# Patient Record
Sex: FEMALE | Employment: OTHER | URBAN - METROPOLITAN AREA
[De-identification: names, ages, dates, MRNs, and addresses within clinical notes are randomized per-mention and may not be internally consistent; named-entity substitution may affect disease eponyms.]

---

## 2017-11-08 ENCOUNTER — HOSPITAL ENCOUNTER (EMERGENCY)
Age: 82
Discharge: HOME | End: 2017-11-08
Attending: EMERGENCY MEDICINE | Admitting: EMERGENCY MEDICINE
Payer: MEDICARE

## 2017-11-08 ENCOUNTER — APPOINTMENT (OUTPATIENT)
Dept: GENERAL RADIOLOGY | Age: 82
End: 2017-11-08
Attending: EMERGENCY MEDICINE
Payer: MEDICARE

## 2017-11-08 ENCOUNTER — APPOINTMENT (OUTPATIENT)
Dept: CT IMAGING | Age: 82
End: 2017-11-08
Attending: EMERGENCY MEDICINE
Payer: MEDICARE

## 2017-11-08 VITALS
OXYGEN SATURATION: 96 % | RESPIRATION RATE: 18 BRPM | SYSTOLIC BLOOD PRESSURE: 134 MMHG | BODY MASS INDEX: 32.22 KG/M2 | DIASTOLIC BLOOD PRESSURE: 62 MMHG | WEIGHT: 165 LBS | TEMPERATURE: 98.2 F | HEART RATE: 66 BPM

## 2017-11-08 DIAGNOSIS — R07.89 ATYPICAL CHEST PAIN: Primary | ICD-10-CM

## 2017-11-08 DIAGNOSIS — K44.9 HIATAL HERNIA: ICD-10-CM

## 2017-11-08 DIAGNOSIS — R09.1 PLEURISY: ICD-10-CM

## 2017-11-08 PROBLEM — R07.9 CHEST PAIN: Status: ACTIVE | Noted: 2017-11-08

## 2017-11-08 LAB
ALBUMIN SERPL-MCNC: 3.6 G/DL (ref 3.5–5)
ALBUMIN/GLOB SERPL: 1 {RATIO} (ref 1.1–2.2)
ALP SERPL-CCNC: 120 U/L (ref 45–117)
ALT SERPL-CCNC: 42 U/L (ref 12–78)
AMYLASE SERPL-CCNC: 34 U/L (ref 25–115)
ANION GAP SERPL CALC-SCNC: 8 MMOL/L (ref 5–15)
APTT PPP: <20 SEC (ref 22.1–32.5)
ARTERIAL PATENCY WRIST A: YES
AST SERPL-CCNC: 100 U/L (ref 15–37)
ATRIAL RATE: 83 BPM
BASE EXCESS BLD CALC-SCNC: 1 MMOL/L
BASOPHILS # BLD: 0 K/UL (ref 0–0.1)
BASOPHILS NFR BLD: 0 % (ref 0–1)
BDY SITE: ABNORMAL
BILIRUB SERPL-MCNC: 0.9 MG/DL (ref 0.2–1)
BUN SERPL-MCNC: 17 MG/DL (ref 6–20)
BUN/CREAT SERPL: 20 (ref 12–20)
CALCIUM SERPL-MCNC: 8.1 MG/DL (ref 8.5–10.1)
CALCULATED P AXIS, ECG09: 36 DEGREES
CALCULATED R AXIS, ECG10: -13 DEGREES
CALCULATED T AXIS, ECG11: 72 DEGREES
CHLORIDE SERPL-SCNC: 109 MMOL/L (ref 97–108)
CK MB CFR SERPL CALC: 1.3 % (ref 0–2.5)
CK MB SERPL-MCNC: 1.3 NG/ML (ref 5–25)
CK SERPL-CCNC: 97 U/L (ref 26–192)
CO2 SERPL-SCNC: 27 MMOL/L (ref 21–32)
CREAT SERPL-MCNC: 0.83 MG/DL (ref 0.55–1.02)
D DIMER PPP FEU-MCNC: 0.98 MG/L FEU (ref 0–0.65)
DIAGNOSIS, 93000: NORMAL
DIFFERENTIAL METHOD BLD: ABNORMAL
EOSINOPHIL # BLD: 0.1 K/UL (ref 0–0.4)
EOSINOPHIL NFR BLD: 1 % (ref 0–7)
ERYTHROCYTE [DISTWIDTH] IN BLOOD BY AUTOMATED COUNT: 15.6 % (ref 11.5–14.5)
GAS FLOW.O2 O2 DELIVERY SYS: ABNORMAL L/MIN
GLOBULIN SER CALC-MCNC: 3.6 G/DL (ref 2–4)
GLUCOSE BLD STRIP.AUTO-MCNC: 108 MG/DL (ref 65–100)
GLUCOSE SERPL-MCNC: 117 MG/DL (ref 65–100)
HCO3 BLD-SCNC: 24.9 MMOL/L (ref 22–26)
HCT VFR BLD AUTO: 42.6 % (ref 35–47)
HGB BLD-MCNC: 13.6 G/DL (ref 11.5–16)
INR PPP: 1 (ref 0.9–1.1)
LIPASE SERPL-CCNC: 130 U/L (ref 73–393)
LYMPHOCYTES # BLD: 0.7 K/UL (ref 0.8–3.5)
LYMPHOCYTES NFR BLD: 6 % (ref 12–49)
MCH RBC QN AUTO: 27.8 PG (ref 26–34)
MCHC RBC AUTO-ENTMCNC: 31.9 G/DL (ref 30–36.5)
MCV RBC AUTO: 87.1 FL (ref 80–99)
MONOCYTES # BLD: 1.4 K/UL (ref 0–1)
MONOCYTES NFR BLD: 11 % (ref 5–13)
NEUTS SEG # BLD: 10.1 K/UL (ref 1.8–8)
NEUTS SEG NFR BLD: 82 % (ref 32–75)
O2/TOTAL GAS SETTING VFR VENT: 21 %
P-R INTERVAL, ECG05: 136 MS
PCO2 BLD: 37.5 MMHG (ref 35–45)
PH BLD: 7.43 [PH] (ref 7.35–7.45)
PLATELET # BLD AUTO: 228 K/UL (ref 150–400)
PO2 BLD: 67 MMHG (ref 80–100)
POTASSIUM SERPL-SCNC: 4 MMOL/L (ref 3.5–5.1)
PROT SERPL-MCNC: 7.2 G/DL (ref 6.4–8.2)
PROTHROMBIN TIME: 10.3 SEC (ref 9–11.1)
Q-T INTERVAL, ECG07: 390 MS
QRS DURATION, ECG06: 72 MS
QTC CALCULATION (BEZET), ECG08: 458 MS
RBC # BLD AUTO: 4.89 M/UL (ref 3.8–5.2)
RBC MORPH BLD: ABNORMAL
SAO2 % BLD: 94 % (ref 92–97)
SERVICE CMNT-IMP: ABNORMAL
SODIUM SERPL-SCNC: 144 MMOL/L (ref 136–145)
SPECIMEN TYPE: ABNORMAL
THERAPEUTIC RANGE,PTTT: ABNORMAL SECS (ref 58–77)
TROPONIN I BLD-MCNC: <0.04 NG/ML (ref 0–0.08)
TROPONIN I SERPL-MCNC: <0.04 NG/ML
VENTRICULAR RATE, ECG03: 83 BPM
WBC # BLD AUTO: 12.3 K/UL (ref 3.6–11)

## 2017-11-08 PROCEDURE — 36600 WITHDRAWAL OF ARTERIAL BLOOD: CPT

## 2017-11-08 PROCEDURE — 82550 ASSAY OF CK (CPK): CPT | Performed by: EMERGENCY MEDICINE

## 2017-11-08 PROCEDURE — 85379 FIBRIN DEGRADATION QUANT: CPT | Performed by: EMERGENCY MEDICINE

## 2017-11-08 PROCEDURE — 82150 ASSAY OF AMYLASE: CPT | Performed by: EMERGENCY MEDICINE

## 2017-11-08 PROCEDURE — 82803 BLOOD GASES ANY COMBINATION: CPT

## 2017-11-08 PROCEDURE — 74011636320 HC RX REV CODE- 636/320: Performed by: EMERGENCY MEDICINE

## 2017-11-08 PROCEDURE — 82962 GLUCOSE BLOOD TEST: CPT

## 2017-11-08 PROCEDURE — 85730 THROMBOPLASTIN TIME PARTIAL: CPT | Performed by: EMERGENCY MEDICINE

## 2017-11-08 PROCEDURE — 85610 PROTHROMBIN TIME: CPT | Performed by: EMERGENCY MEDICINE

## 2017-11-08 PROCEDURE — 84484 ASSAY OF TROPONIN QUANT: CPT | Performed by: EMERGENCY MEDICINE

## 2017-11-08 PROCEDURE — 71010 XR CHEST PORT: CPT

## 2017-11-08 PROCEDURE — 74011000258 HC RX REV CODE- 258: Performed by: EMERGENCY MEDICINE

## 2017-11-08 PROCEDURE — 80053 COMPREHEN METABOLIC PANEL: CPT | Performed by: EMERGENCY MEDICINE

## 2017-11-08 PROCEDURE — 83690 ASSAY OF LIPASE: CPT | Performed by: EMERGENCY MEDICINE

## 2017-11-08 PROCEDURE — 93005 ELECTROCARDIOGRAM TRACING: CPT

## 2017-11-08 PROCEDURE — 85025 COMPLETE CBC W/AUTO DIFF WBC: CPT | Performed by: EMERGENCY MEDICINE

## 2017-11-08 PROCEDURE — 99285 EMERGENCY DEPT VISIT HI MDM: CPT

## 2017-11-08 PROCEDURE — 74011250637 HC RX REV CODE- 250/637: Performed by: EMERGENCY MEDICINE

## 2017-11-08 PROCEDURE — 36415 COLL VENOUS BLD VENIPUNCTURE: CPT | Performed by: EMERGENCY MEDICINE

## 2017-11-08 PROCEDURE — 71275 CT ANGIOGRAPHY CHEST: CPT

## 2017-11-08 RX ORDER — CODEINE PHOSPHATE AND GUAIFENESIN 10; 100 MG/5ML; MG/5ML
5 SOLUTION ORAL
Qty: 120 ML | Refills: 0 | Status: SHIPPED | OUTPATIENT
Start: 2017-11-08 | End: 2022-01-18

## 2017-11-08 RX ORDER — SODIUM CHLORIDE 0.9 % (FLUSH) 0.9 %
10 SYRINGE (ML) INJECTION
Status: COMPLETED | OUTPATIENT
Start: 2017-11-08 | End: 2017-11-08

## 2017-11-08 RX ORDER — CODEINE PHOSPHATE AND GUAIFENESIN 10; 100 MG/5ML; MG/5ML
10 SOLUTION ORAL
Status: COMPLETED | OUTPATIENT
Start: 2017-11-08 | End: 2017-11-08

## 2017-11-08 RX ORDER — OMEPRAZOLE 20 MG/1
20 CAPSULE, DELAYED RELEASE ORAL DAILY
Qty: 30 CAP | Refills: 0 | Status: SHIPPED | OUTPATIENT
Start: 2017-11-08 | End: 2022-01-18

## 2017-11-08 RX ORDER — PANTOPRAZOLE SODIUM 40 MG/1
40 TABLET, DELAYED RELEASE ORAL DAILY
Qty: 30 TAB | Refills: 0 | Status: SHIPPED | OUTPATIENT
Start: 2017-11-08 | End: 2022-01-18

## 2017-11-08 RX ADMIN — IOPAMIDOL 75 ML: 755 INJECTION, SOLUTION INTRAVENOUS at 06:40

## 2017-11-08 RX ADMIN — Medication 10 ML: at 06:40

## 2017-11-08 RX ADMIN — GUAIFENESIN AND CODEINE PHOSPHATE 10 ML: 100; 10 SOLUTION ORAL at 05:25

## 2017-11-08 RX ADMIN — SODIUM CHLORIDE 100 ML: 900 INJECTION, SOLUTION INTRAVENOUS at 06:40

## 2017-11-08 NOTE — ED TRIAGE NOTES
Patient comes in vis EMS. Per EMS patient woke up about 2 hours ago with sub-sternal chest pain that is not radiating. Rates it 3/10. Per patient she woke up with chest pain, shortness of breath and a headache.

## 2017-11-08 NOTE — ED NOTES
Assumed care of patient at this time. She comes in via EMS stating that she woke up about 0300 with substernal chest pain, rated at 3/10, also had shortness of breath and a headache. By 6403 she had called EMS to bring her to the hospital. Her initial SpO2 was 88% on RA, initiated 2L NC and she improved to 92%. She reports at this time that she still has chest pain but the shortness of breath is improved. IV established, labs sent and EKG completed.

## 2017-11-08 NOTE — ED NOTES
Pt wheeled out of ED with discharge instructions and prescriptions in hand given by Dr. Yolette Dc; pt verbalized understanding of discharge paperwork and time allotted for questions. VSS. Pt alert and oriented.

## 2017-11-08 NOTE — ED NOTES
Reassessed patient at this time. She has completed a Chest CT, and has gotten up to void x1, Urine sent on hold. She states she feels significantly better, her saturations are improved and her pain is minimal. Dr. Argenis Augustin went to bedside for a quick update, told the family we are waiting for the CT report and pending those results we will develop a plan of care. Patient and  verbalize understanding. Call bell remains within reach of patient.

## 2017-11-08 NOTE — ED NOTES
Patient is resting comfortably in bed at this time. VSS. SpO2 greatly improved on 3L NC. Call bell within reach of patient.

## 2017-11-08 NOTE — ED PROVIDER NOTES
HPI Comments: The patient is a 60-year-old female with a past medical history significant for GERD, skin cancer, liver disease, diabetes, arthritis, chronic back pain, status post hysterectomy, cholecystectomy, who presents to the ED by EMS with a complaint of his tendon sharp, stabbing chest pain that began 2 days ago, and getting progressively worse, aggravated by coughing, and no relieving factors. The patient is calm is symptom free and comfortable. She is also complaining of headache,. She denies any neck pain, back pain, abdominal pain, nausea, vomiting, diarrhea, constipation, dysuria, hematuria, vaginal discharge or bleeding, dizziness, weakness, or numbness, prior history of same. The patient also denies any cigarette smoke exposure, sick contacts or recent travel. Patient is a [de-identified] y.o. female presenting with chest pain. Chest Pain (Angina)           Past Medical History:   Diagnosis Date    Arthritis     OSTEO, SPINE    Cancer ([facility] [address] 2001    BASAL CELL NOSE    Chronic pain     BACK    Diabetes ([facility] [address]     BORDERLINE, NIDDM    GERD (gastroesophageal reflux disease)     HIATAL HERNIA    Liver disease 1960    HEP A       Past Surgical History:   Procedure Laterality Date    HX CHOLECYSTECTOMY  2008    HX RALPH AND BSO  1995    HX TONSILLECTOMY      CHILD         Family History:   Problem Relation Age of Onset    Cancer Mother      COLON    Cancer Father      PANCREATIC    Other Brother      VASCULAR DISEASE    Heart Disease Brother        Social History     Social History    Marital status:      Spouse name: N/A    Number of children: N/A    Years of education: N/A     Occupational History    Not on file.      Social History Main Topics    Smoking status: Never Smoker    Smokeless tobacco: Never Used    Alcohol use 1.2 oz/week     1 Glasses of wine, 1 Shots of liquor per week      Comment: 7 DRINKS WEEK    Drug use: No    Sexual activity: Not on file     Other Topics Concern    Not on file     Social History Narrative         ALLERGIES: Review of patient's allergies indicates no known allergies. Review of Systems   Cardiovascular: Positive for chest pain. All other systems reviewed and are negative. Vitals:    11/08/17 0502 11/08/17 0504 11/08/17 0543   BP: (!) 167/95 (!) 167/95    Pulse: 79     Resp: 14     Temp: 97.5 °F (36.4 °C)     SpO2: 90% (!) 89% 97%   Weight: 74.8 kg (165 lb)              Physical Exam   Nursing note and vitals reviewed. CONSTITUTIONAL: Well-appearing; well-nourished; in no apparent distress  HEAD: Normocephalic; atraumatic  EYES: PERRL; EOM intact; conjunctiva and sclera are clear bilaterally. ENT: No rhinorrhea; normal pharynx with no tonsillar hypertrophy; mucous membranes pink/moist, no erythema, no exudate. NECK: Supple; non-tender; no cervical lymphadenopathy  CARD: Normal S1, S2; no murmurs, rubs, or gallops. Regular rate and rhythm. RESP: Normal respiratory effort; breath sounds clear and equal bilaterally; no wheezes, rhonchi, or rales. ABD: Normal bowel sounds; non-distended; non-tender; no palpable organomegaly, no masses, no bruits. Back Exam: Normal inspection; no vertebral point tenderness, no CVA tenderness. Normal range of motion. EXT: Normal ROM in all four extremities; non-tender to palpation; no swelling or deformity; distal pulses are normal, no edema. SKIN: Warm; dry; no rash. NEURO:Alert and oriented x 3, coherent, JAIR-XII grossly intact, sensory and motor are non-focal.        MDM  Number of Diagnoses or Management Options  Diagnosis management comments: Assessment: the patient is an 82-year-old female, who presents with pleuritic chest pain, headache, began this evening and has improved at this time. The patient remained hemodynamically stable. Differential diagnoses consist of ACS, pneumonia, bronchitis,VTE. Plan: EKG/ chest x-ray/ lab/ IV fluid/ antitussives/ CTA chest/ serial exam/ Monitor and Reevaluate. Amount and/or Complexity of Data Reviewed  Clinical lab tests: ordered and reviewed  Tests in the radiology section of CPT®: ordered and reviewed  Tests in the medicine section of CPT®: ordered and reviewed  Discussion of test results with the performing providers: yes  Decide to obtain previous medical records or to obtain history from someone other than the patient: yes  Obtain history from someone other than the patient: yes  Review and summarize past medical records: yes  Discuss the patient with other providers: yes  Independent visualization of images, tracings, or specimens: yes    Risk of Complications, Morbidity, and/or Mortality  Presenting problems: moderate  Diagnostic procedures: moderate  Management options: moderate    Critical Care  Total time providing critical care: (Total critical care time spent exclusive of procedures: 45 minutes)    ED Course       Procedures     ED EKG interpretation:  Rhythm: normal sinus rhythm; and regular . Rate (approx.): 83; Axis: left axis deviation; P wave: normal; QRS interval: normal ; ST/T wave: normal; in  Lead: Diffusely; Other findings: borderline ekg. This EKG was interpreted by Liberty Webb MD,ED Provider. XRAY INTERPRETATION (ED MD)  Chest Xray  No acute process seen. Normal heart size. No bony abnormalities. No infiltrate. Liberty Webb MD 6:02 AM    CONSULT NOTE:  Liberty Webb MD spoke with Dr. Harris Alvarez of Cardiology. Discussed patient's presentation, history, physical assessment, and available diagnostic results. He came to the ED and saw the patient. Agrees with the current plan of care. Patient can be discharged home and will follow up in the office for outpatient reevaluation and work-up as deemed appropriate. Progress Note:   Pt has been reexamined by Liberty Webb MD. Pt is feeling much better. Symptoms have improved. All available results have been reviewed with pt and any available family.  The pt likely has pleurisy from URI vs acute exacerbation of Hiatal hernia. Pt understands sx, dx, and tx in ED. Care plan has been outlined and questions have been answered. Pt is ready to go home. Will send home on Bronchitis/ Hiatal hernia instruction. Prescription of Robitussin AC. Outpatient referral with PCP as needed. Written by Onel Earl MD,8:27 AM    .   .

## 2017-11-08 NOTE — CONSULTS
Date of  Admission: 11/8/2017  4:54 AM     Marion Olmedo is a [de-identified] y.o. female presenting with chest pain  Subjective:  Generally healthy patient without known cardiac disease. Yesterday had dry cough, and early this am woke up with lower chest and upper abdominal pain. Difficult to take a deep breath. Had severe headache. Symptoms resolved in about 2hrs. On arrival to er was concern for hypoxia on oximetry however blood gas showed good oxygenation. cta chest showed no acute disease, very large hiatal hernia. She is active and exercises on treadmill for 30mins at least 3 days a week without symptoms suggestive of angina or chf.    denies dyspnea, irregular heart beats, syncope, orthopnea, paroxysmal nocturnal dyspnea, exertional chest pressure/discomfort, claudication, lower extremity edema. Cardiac risk factors: diabetes mellitus, post-menopausal.    Assessment/Plan:  Her presenting symptoms and evaluation thus far does not suggest an acute coronary syndrome. Low risk factor profile. Could be related to hiatal hernia. At this point do not think she needs any further cardiac testing or follow up.      Patient Active Problem List    Diagnosis Date Noted    Chest pain 11/08/2017    Abdominal pain, other specified site 12/22/2011    Lumbar stenosis 08/03/2011      Leopoldo Madison, MD  Past Medical History:   Diagnosis Date    Arthritis     OSTEO, SPINE    Cancer ([facility] [address] 2001    BASAL CELL NOSE    Chronic pain     BACK    Diabetes ([facility] [address]     BORDERLINE, NIDDM    GERD (gastroesophageal reflux disease)     HIATAL HERNIA    Liver disease 1960    HEP A      Past Surgical History:   Procedure Laterality Date    HX CHOLECYSTECTOMY  2008    HX RALPH AND BSO  1995    HX TONSILLECTOMY      CHILD     No Known Allergies   Family History   Problem Relation Age of Onset    Cancer Mother      COLON    Cancer Father      PANCREATIC    Other Brother      VASCULAR DISEASE    Heart Disease Brother      valve replacement No current facility-administered medications for this encounter. Current Outpatient Prescriptions   Medication Sig    oxybutynin chloride (DITROPAN XL) 5 mg CR tablet Take 2 Tabs by mouth daily.  RISEDRONATE SODIUM (ACTONEL PO) Take  by mouth every seven (7) days.  calcium-vitamin D (CALCIUM 500+D) 500 mg(1,250mg) -200 unit per tablet Take 1 Tab by mouth daily.  pantoprazole (PROTONIX) 40 mg tablet Take 1 Tab by mouth daily.  GLIPIZIDE PO Take  by mouth daily (before dinner).  cholecalciferol, vitamin d3, (VITAMIN D) 1,000 unit tablet Take 1,000 Units by mouth daily. 4 TAB DAILY    celecoxib (CELEBREX) 200 mg capsule Take 200 mg by mouth daily as needed. Review of Symptoms:  A comprehensive review of systems was negative except for: Gastrointestinal: positive for early saitiety    Physical Exam    Visit Vitals    /65    Pulse 65    Temp 98.6 °F (37 °C)    Resp 14    Wt 165 lb (74.8 kg)    SpO2 96%    BMI 32.22 kg/m2     Neck: supple, symmetrical, trachea midline, no adenopathy, no carotid bruit and no JVD  Heart: regular rate and rhythm, S1, S2 normal, no murmur, click, rub or gallop  Lungs: clear to auscultation bilaterally  Abdomen: soft, non-tender.  Bowel sounds normal. No masses,  no organomegaly  Extremities: extremities normal, atraumatic, no cyanosis or edema  Pulses: 2+ and symmetric  Neurologic: Grossly normal    Cardiographics    Telemetry: normal sinus rhythm  ECG: normal sinus rhythm, nonspecific ST and T waves changes  Echocardiogram: Not done    Recent radiology, intake/output and wt reviewed    Labs:   Recent Results (from the past 24 hour(s))   EKG, 12 LEAD, INITIAL    Collection Time: 11/08/17  4:59 AM   Result Value Ref Range    Ventricular Rate 83 BPM    Atrial Rate 83 BPM    P-R Interval 136 ms    QRS Duration 72 ms    Q-T Interval 390 ms    QTC Calculation (Bezet) 458 ms    Calculated P Axis 36 degrees    Calculated R Axis -13 degrees    Calculated T Axis 72 degrees    Diagnosis       Normal sinus rhythm  When compared with ECG of 21-DEC-2011 22:28,  Nonspecific T wave abnormality, improved in Inferior leads  Nonspecific T wave abnormality now evident in Lateral leads     GLUCOSE, POC    Collection Time: 11/08/17  5:06 AM   Result Value Ref Range    Glucose (POC) 108 (H) 65 - 100 mg/dL    Performed by Cinthia Woodard (PCT)    AMYLASE    Collection Time: 11/08/17  5:10 AM   Result Value Ref Range    Amylase 34 25 - 115 U/L   CBC WITH AUTOMATED DIFF    Collection Time: 11/08/17  5:10 AM   Result Value Ref Range    WBC 12.3 (H) 3.6 - 11.0 K/uL    RBC 4.89 3.80 - 5.20 M/uL    HGB 13.6 11.5 - 16.0 g/dL    HCT 42.6 35.0 - 47.0 %    MCV 87.1 80.0 - 99.0 FL    MCH 27.8 26.0 - 34.0 PG    MCHC 31.9 30.0 - 36.5 g/dL    RDW 15.6 (H) 11.5 - 14.5 %    PLATELET 237 413 - 243 K/uL    NEUTROPHILS 82 (H) 32 - 75 %    LYMPHOCYTES 6 (L) 12 - 49 %    MONOCYTES 11 5 - 13 %    EOSINOPHILS 1 0 - 7 %    BASOPHILS 0 0 - 1 %    ABS. NEUTROPHILS 10.1 (H) 1.8 - 8.0 K/UL    ABS. LYMPHOCYTES 0.7 (L) 0.8 - 3.5 K/UL    ABS. MONOCYTES 1.4 (H) 0.0 - 1.0 K/UL    ABS. EOSINOPHILS 0.1 0.0 - 0.4 K/UL    ABS.  BASOPHILS 0.0 0.0 - 0.1 K/UL    DF SMEAR SCANNED      RBC COMMENTS ANISOCYTOSIS  1+       CK W/ CKMB & INDEX    Collection Time: 11/08/17  5:10 AM   Result Value Ref Range    CK 97 26 - 192 U/L    CK - MB 1.3 <3.6 NG/ML    CK-MB Index 1.3 0 - 2.5     D DIMER    Collection Time: 11/08/17  5:10 AM   Result Value Ref Range    D-dimer 0.98 (H) 0.00 - 0.65 mg/L FEU   LIPASE    Collection Time: 11/08/17  5:10 AM   Result Value Ref Range    Lipase 130 73 - 197 U/L   METABOLIC PANEL, COMPREHENSIVE    Collection Time: 11/08/17  5:10 AM   Result Value Ref Range    Sodium 144 136 - 145 mmol/L    Potassium 4.0 3.5 - 5.1 mmol/L    Chloride 109 (H) 97 - 108 mmol/L    CO2 27 21 - 32 mmol/L    Anion gap 8 5 - 15 mmol/L    Glucose 117 (H) 65 - 100 mg/dL    BUN 17 6 - 20 MG/DL    Creatinine 0.83 0.55 - 1.02 MG/DL BUN/Creatinine ratio 20 12 - 20      GFR est AA >60 >60 ml/min/1.73m2    GFR est non-AA >60 >60 ml/min/1.73m2    Calcium 8.1 (L) 8.5 - 10.1 MG/DL    Bilirubin, total 0.9 0.2 - 1.0 MG/DL    ALT (SGPT) 42 12 - 78 U/L    AST (SGOT) 100 (H) 15 - 37 U/L    Alk.  phosphatase 120 (H) 45 - 117 U/L    Protein, total 7.2 6.4 - 8.2 g/dL    Albumin 3.6 3.5 - 5.0 g/dL    Globulin 3.6 2.0 - 4.0 g/dL    A-G Ratio 1.0 (L) 1.1 - 2.2     PROTHROMBIN TIME + INR    Collection Time: 11/08/17  5:10 AM   Result Value Ref Range    INR 1.0 0.9 - 1.1      Prothrombin time 10.3 9.0 - 11.1 sec   PTT    Collection Time: 11/08/17  5:10 AM   Result Value Ref Range    aPTT <20.0 (L) 22.1 - 32.5 sec    aPTT, therapeutic range     58.0 - 77.0 SECS   TROPONIN I    Collection Time: 11/08/17  5:10 AM   Result Value Ref Range    Troponin-I, Qt. <0.04 <0.05 ng/mL   POC G3 - PUL    Collection Time: 11/08/17  5:32 AM   Result Value Ref Range    FIO2 (POC) 21 %    pH (POC) 7.430 7.35 - 7.45      pCO2 (POC) 37.5 35.0 - 45.0 MMHG    pO2 (POC) 67 (L) 80 - 100 MMHG    HCO3 (POC) 24.9 22 - 26 MMOL/L    sO2 (POC) 94 92 - 97 %    Base excess (POC) 1 mmol/L    Site LEFT RADIAL      Device: ROOM AIR      Allens test (POC) YES      Specimen type (POC) ARTERIAL

## 2017-11-08 NOTE — DISCHARGE INSTRUCTIONS
We hope that we have addressed all of your medical concerns. The examination and treatment you received in the Emergency Department were for an emergent problem and were not intended as complete care. It is important that you follow up with your healthcare provider(s) for ongoing care. If your symptoms worsen or do not improve as expected, and you are unable to reach your usual health care provider(s), you should return to the Emergency Department. Today's healthcare is undergoing tremendous change, and patient satisfaction surveys are one of the many tools to assess the quality of medical care. You may receive a survey from the [employer] regarding your experience in the Emergency Department. I hope that your experience has been completely positive, particularly the medical care that I provided. As such, please participate in the survey; anything less than excellent does not meet my expectations or intentions. [facility] and 8 [facility] participate in nationally recognized quality of care measures. If your blood pressure is greater than 120/80, as reported below, we urge that you seek medical care to address the potential of high blood pressure, commonly known as hypertension. Hypertension can be hereditary or can be caused by certain medical conditions, pain, stress, or \"white coat syndrome. \"       Please make an appointment with your health care provider(s) for follow up of your Emergency Department visit. VITALS:   Patient Vitals for the past 8 hrs:   Temp Pulse Resp BP SpO2   11/08/17 0800 - 65 14 138/65 96 %   11/08/17 0600 98.6 °F (37 °C) 74 18 134/63 95 %   11/08/17 0543 - - - - 97 %   11/08/17 0504 - - - (!) 167/95 (!) 89 %   11/08/17 0502 97.5 °F (36.4 °C) 79 14 (!) 167/95 90 %          Thank you for allowing us to provide you with medical care today. We realize that you have many choices for your emergency care needs. Please choose us in the future for any continued health care needs. Moses Pugh MD    [address] [facility].   Office: [phone]            Recent Results (from the past 24 hour(s))   EKG, 12 LEAD, INITIAL    Collection Time: 11/08/17  4:59 AM   Result Value Ref Range    Ventricular Rate 83 BPM    Atrial Rate 83 BPM    P-R Interval 136 ms    QRS Duration 72 ms    Q-T Interval 390 ms    QTC Calculation (Bezet) 458 ms    Calculated P Axis 36 degrees    Calculated R Axis -13 degrees    Calculated T Axis 72 degrees    Diagnosis       Normal sinus rhythm  When compared with ECG of 21-DEC-2011 22:28,  Nonspecific T wave abnormality, improved in Inferior leads  Nonspecific T wave abnormality now evident in Lateral leads     GLUCOSE, POC    Collection Time: 11/08/17  5:06 AM   Result Value Ref Range    Glucose (POC) 108 (H) 65 - 100 mg/dL    Performed by Regan Delgado (PCT)    AMYLASE    Collection Time: 11/08/17  5:10 AM   Result Value Ref Range    Amylase 34 25 - 115 U/L   CBC WITH AUTOMATED DIFF    Collection Time: 11/08/17  5:10 AM   Result Value Ref Range    WBC 12.3 (H) 3.6 - 11.0 K/uL    RBC 4.89 3.80 - 5.20 M/uL    HGB 13.6 11.5 - 16.0 g/dL    HCT 42.6 35.0 - 47.0 %    MCV 87.1 80.0 - 99.0 FL    MCH 27.8 26.0 - 34.0 PG    MCHC 31.9 30.0 - 36.5 g/dL    RDW 15.6 (H) 11.5 - 14.5 %    PLATELET 438 093 - 984 K/uL    NEUTROPHILS 82 (H) 32 - 75 %    LYMPHOCYTES 6 (L) 12 - 49 %    MONOCYTES 11 5 - 13 %    EOSINOPHILS 1 0 - 7 %    BASOPHILS 0 0 - 1 %    ABS. NEUTROPHILS 10.1 (H) 1.8 - 8.0 K/UL    ABS. LYMPHOCYTES 0.7 (L) 0.8 - 3.5 K/UL    ABS. MONOCYTES 1.4 (H) 0.0 - 1.0 K/UL    ABS. EOSINOPHILS 0.1 0.0 - 0.4 K/UL    ABS.  BASOPHILS 0.0 0.0 - 0.1 K/UL    DF SMEAR SCANNED      RBC COMMENTS ANISOCYTOSIS  1+       CK W/ CKMB & INDEX    Collection Time: 11/08/17  5:10 AM   Result Value Ref Range    CK 97 26 - 192 U/L    CK - MB 1.3 <3.6 NG/ML    CK-MB Index 1.3 0 - 2.5     D DIMER Collection Time: 11/08/17  5:10 AM   Result Value Ref Range    D-dimer 0.98 (H) 0.00 - 0.65 mg/L FEU   LIPASE    Collection Time: 11/08/17  5:10 AM   Result Value Ref Range    Lipase 130 73 - 477 U/L   METABOLIC PANEL, COMPREHENSIVE    Collection Time: 11/08/17  5:10 AM   Result Value Ref Range    Sodium 144 136 - 145 mmol/L    Potassium 4.0 3.5 - 5.1 mmol/L    Chloride 109 (H) 97 - 108 mmol/L    CO2 27 21 - 32 mmol/L    Anion gap 8 5 - 15 mmol/L    Glucose 117 (H) 65 - 100 mg/dL    BUN 17 6 - 20 MG/DL    Creatinine 0.83 0.55 - 1.02 MG/DL    BUN/Creatinine ratio 20 12 - 20      GFR est AA >60 >60 ml/min/1.73m2    GFR est non-AA >60 >60 ml/min/1.73m2    Calcium 8.1 (L) 8.5 - 10.1 MG/DL    Bilirubin, total 0.9 0.2 - 1.0 MG/DL    ALT (SGPT) 42 12 - 78 U/L    AST (SGOT) 100 (H) 15 - 37 U/L    Alk.  phosphatase 120 (H) 45 - 117 U/L    Protein, total 7.2 6.4 - 8.2 g/dL    Albumin 3.6 3.5 - 5.0 g/dL    Globulin 3.6 2.0 - 4.0 g/dL    A-G Ratio 1.0 (L) 1.1 - 2.2     PROTHROMBIN TIME + INR    Collection Time: 11/08/17  5:10 AM   Result Value Ref Range    INR 1.0 0.9 - 1.1      Prothrombin time 10.3 9.0 - 11.1 sec   PTT    Collection Time: 11/08/17  5:10 AM   Result Value Ref Range    aPTT <20.0 (L) 22.1 - 32.5 sec    aPTT, therapeutic range     58.0 - 77.0 SECS   TROPONIN I    Collection Time: 11/08/17  5:10 AM   Result Value Ref Range    Troponin-I, Qt. <0.04 <0.05 ng/mL   POC G3 - PUL    Collection Time: 11/08/17  5:32 AM   Result Value Ref Range    FIO2 (POC) 21 %    pH (POC) 7.430 7.35 - 7.45      pCO2 (POC) 37.5 35.0 - 45.0 MMHG    pO2 (POC) 67 (L) 80 - 100 MMHG    HCO3 (POC) 24.9 22 - 26 MMOL/L    sO2 (POC) 94 92 - 97 %    Base excess (POC) 1 mmol/L    Site LEFT RADIAL      Device: ROOM AIR      Allens test (POC) YES      Specimen type (POC) ARTERIAL         Cta Chest W Or W Wo Cont    Result Date: 11/8/2017  INDICATION: CP and sOB EXAM: CT Angio Chest: TECHNIQUE: Unenhanced localizing CT imaging of the pulmonary arteries is followed by bolus injection of 75 mL Isovue 370 contrast, with thin section axial Chest CT obtained and 3D image post processing performed including coronal MIPS. CT dose reduction was achieved through use of a standardized protocol tailored for this examination and automatic exposure control for dose modulation. FINDINGS: There is no pulmonary embolism. There is no apparent aortic dissection or aneurysm. Lungs show no acute process. There is a large hiatal hernia containing the entire stomach, without apparent inflammation or obstruction. There is no pneumothorax. There is no pleural or significant pericardial effusion. There is no significant adenopathy. IMPRESSION: 1. No Pulmonary Embolus. 2. No Acute Findings. 3. Large hiatal hernia without associated inflammation. Xr Chest Port    Result Date: 11/8/2017  EXAM: Portable CXR.  0516 hours  INDICATION: Chest Pain The lungs are clear. Heart is normal in size. There is no overt pulmonary edema. There is no evident pneumothorax, apparent adenopathy or sizable pleural effusion. There is a hiatal hernia. IMPRESSION: No Acute Disease. Hiatal Hernia: Care Instructions  Your Care Instructions  A hiatal hernia occurs when part of the stomach bulges into the chest cavity. A hiatal hernia may allow stomach acid and juices to back up into the esophagus (acid reflux). This can cause a feeling of burning, warmth, heat, or pain behind the breastbone. This feeling may often occur after you eat, soon after you lie down, or when you bend forward, and it may come and go. You also may have a sour taste in your mouth. These symptoms are commonly known as heartburn or reflux. But not all hiatal hernias cause symptoms. Follow-up care is a key part of your treatment and safety. Be sure to make and go to all appointments, and call your doctor if you are having problems.  It's also a good idea to know your test results and keep a list of the medicines you take. How can you care for yourself at home? · Take your medicines exactly as prescribed. Call your doctor if you think you are having a problem with your medicine. · Do not take aspirin or other nonsteroidal anti-inflammatory drugs (NSAIDs), such as ibuprofen (Advil, Motrin) or naproxen (Aleve), unless your doctor says it is okay. Ask your doctor what you can take for pain. · Your doctor may recommend over-the-counter medicine. For mild or occasional indigestion, antacids such as Tums, Gaviscon, Maalox, or Mylanta may help. Your doctor also may recommend over-the-counter acid reducers, such as famotidine (Pepcid AC), cimetidine (Tagamet HB), ranitidine (Zantac 75 and Zantac 150), or omeprazole (Prilosec). Read and follow all instructions on the label. If you use these medicines often, talk with your doctor. · Change your eating habits. ¨ It's best to eat several small meals instead of two or three large meals. ¨ After you eat, wait 2 to 3 hours before you lie down. Late-night snacks aren't a good idea. ¨ Chocolate, mint, and alcohol can make heartburn worse. They relax the valve between the esophagus and the stomach. ¨ Spicy foods, foods that have a lot of acid (like tomatoes and oranges), and coffee can make heartburn symptoms worse in some people. If your symptoms are worse after you eat a certain food, you may want to stop eating that food to see if your symptoms get better. · Do not smoke or chew tobacco.  · If you get heartburn at night, raise the head of your bed 6 to 8 inches by putting the frame on blocks or placing a foam wedge under the head of your mattress. (Adding extra pillows does not work.)  · Do not wear tight clothing around your middle. · Lose weight if you need to. Losing just 5 to 10 pounds can help. When should you call for help? Call your doctor now or seek immediate medical care if:  ? · You have new or worse belly pain. ? · You are vomiting. ? Watch closely for changes in your health, and be sure to contact your doctor if:  ? · You have new or worse symptoms of indigestion. ? · You have trouble or pain swallowing. ? · You are losing weight. ? · You do not get better as expected. Where can you learn more? Go to http://blanca-barbara.info/. Enter K781 in the search box to learn more about \"Hiatal Hernia: Care Instructions. \"  Current as of: May 12, 2017  Content Version: 11.4  © 3254-7877 [employer]. Care instructions adapted under license by "[employer] (which disclaims liability or warranty for this information). If you have questions about a medical condition or this instruction, always ask your healthcare professional. [address] any warranty or liability for your use of this information. Chest Pain: Care Instructions  Your Care Instructions    There are many things that can cause chest pain. Some are not serious and will get better on their own in a few days. But some kinds of chest pain need more testing and treatment. Your doctor may have recommended a follow-up visit in the next 8 to 12 hours. If you are not getting better, you may need more tests or treatment. Even though your doctor has released you, you still need to watch for any problems. The doctor carefully checked you, but sometimes problems can develop later. If you have new symptoms or if your symptoms do not get better, get medical care right away. If you have worse or different chest pain or pressure that lasts more than 5 minutes or you passed out (lost consciousness), call 911 or seek other emergency help right away. A medical visit is only one step in your treatment. Even if you feel better, you still need to do what your doctor recommends, such as going to all suggested follow-up appointments and taking medicines exactly as directed. This will help you recover and help prevent future problems.   How can you care for yourself at home? · Rest until you feel better. · Take your medicine exactly as prescribed. Call your doctor if you think you are having a problem with your medicine. · Do not drive after taking a prescription pain medicine. When should you call for help? Call 911 if:  ? · You passed out (lost consciousness). ? · You have severe difficulty breathing. ? · You have symptoms of a heart attack. These may include:  ¨ Chest pain or pressure, or a strange feeling in your chest.  ¨ Sweating. ¨ Shortness of breath. ¨ Nausea or vomiting. ¨ Pain, pressure, or a strange feeling in your back, neck, jaw, or upper belly or in one or both shoulders or arms. ¨ Lightheadedness or sudden weakness. ¨ A fast or irregular heartbeat. After you call 911, the  may tell you to chew 1 adult-strength or 2 to 4 low-dose aspirin. Wait for an ambulance. Do not try to drive yourself. ?Call your doctor today if:  ? · You have any trouble breathing. ? · Your chest pain gets worse. ? · You are dizzy or lightheaded, or you feel like you may faint. ? · You are not getting better as expected. ? · You are having new or different chest pain. Where can you learn more? Go to http://blanca-barbara.info/. Enter A120 in the search box to learn more about \"Chest Pain: Care Instructions. \"  Current as of: March 20, 2017  Content Version: 11.4  © 2203-9063 [employer]. Care instructions adapted under license by [employer] (which disclaims liability or warranty for this information). If you have questions about a medical condition or this instruction, always ask your healthcare professional. [place] [address] any warranty or liability for your use of this information. Pleurisy: Care Instructions  Your Care Instructions  Pleurisy is inflammation of the tissue that lines the inside of the chest and covers the lungs (pleura).  Pleurisy is often caused by an infection, usually a virus. It also can be caused by other health problems, such as pneumonia or lupus. Pleurisy can cause sharp chest pain that gets worse when you cough or take a deep breath. You may need more tests to find out what is causing your pleurisy. Treatment depends on the cause. Pleurisy may come and go for a few days, or it may continue if the cause has not been treated. Home treatment can help ease symptoms. Follow-up care is a key part of your treatment and safety. Be sure to make and go to all appointments, and call your doctor if you are having problems. It's also a good idea to know your test results and keep a list of the medicines you take. How can you care for yourself at home? · Take an over-the-counter pain medicine, such as acetaminophen (Tylenol), ibuprofen (Advil, Motrin), or naproxen (Aleve). Read and follow all instructions on the label. · Do not take two or more pain medicines at the same time unless the doctor told you to. Many pain medicines have acetaminophen, which is Tylenol. Too much acetaminophen (Tylenol) can be harmful. · If your doctor prescribed antibiotics, take them as directed. Do not stop taking them just because you feel better. You need to take the full course of antibiotics. · Take cough medicine as directed if your doctor recommends it. · Avoid activities that make the pain worse. When should you call for help? Call 911 anytime you think you may need emergency care. For example, call if:  ? · You have severe trouble breathing. ? · You have severe chest pain. ? · You passed out (lost consciousness). ?Call your doctor now or seek immediate medical care if:  ? · You have a new or higher fever. ? Watch closely for changes in your health, and be sure to contact your doctor if:  ? · You begin to cough up yellow or green mucus. ? · You cough up blood. ? · Your symptoms are not better in 3 or 4 days. Where can you learn more?   Go to http://blanca-barbara.info/. Enter F346 in the search box to learn more about \"Pleurisy: Care Instructions. \"  Current as of: May 12, 2017  Content Version: 11.4  © 6864-2854 [employer]. Care instructions adapted under license by [employer] (which disclaims liability or warranty for this information). If you have questions about a medical condition or this instruction, always ask your healthcare professional. [place] [address] any warranty or liability for your use of this information.

## 2017-11-09 NOTE — CALL BACK NOTE
[facility] Emergency Department Follow Up Call Record    Discharged to : Home/Family Home/Home Health/Skilled Facility/Rehab/Assisted Living/Other__Home_____  1) Did you receive your discharge instructions? Yes        2) Do you understand them? Yes         3) Are you able to follow them? Yes          If NO, what can I clarify for you? 4) Do you understand your diagnosis? Yes         5) Do you know which symptoms should prompt you to call the doctor? Yes     6) Were you able to fill and  any medications that were prescribed? Yes     7) You were prescribed ___________for ____________________. Common side effects of this medication are____________________. This is not a complete list so please review the forms given from the pharmacy for a complete list.      8) Are there any questions about your medications? No            Have you scheduled any recommended doctors appointments (specialty, PCP) YES. Visitation with PCP today. If NO, what barriers are you encountering (transportation/lost contact info/cost/  didnt think necessary/no PCP  9) If discharged with Home Health, has the agency contacted you to schedule visit? No  10) Is there anyone available to help you at home (meals, errands, transportation    monitoring) (adult children, neighbors, private duty companions) Yes    11) Are you on a special diet? Yes Encouraged to follow dietary recommendations concerning Hiatal Hernia care. If YES, do you understand the requirements for this diet? YES       Education provided? 12) If presented with cough, bronchitis, COPD, asthma, is it ok to ask that the   respiratory disease management educator call you? Not applicable      13)  A) If presented with fall, were you issued an assistive device in the ED    Are you using? Not applicable  B) If given RX for device, have you obtained? Not applicable       If NO, barriers? C) Therapist recommended:NO   Are you able to implement the suggestions?  Not applicable        If NO, barriers to implementation? D) Are you having any difficulties with mobility inside your home?     (steps, bed, tub)Not applicable   If YES, ask if the [facility] PT can contact patient and good time and number?  14)  At the end of your discharge instructions, there is information about accessing [facility], have you had a chance to review those? Yes         Do you have any questions about signing up for this service? We encourage our patients to be active participants in their healthcare and this site is one of the ways to do that. It will allow you to access parts of your medical record, email your doctors office, schedule appointments, and request medications refills . 15) Are there any other questions that I can answer for you regarding    your Emergency department visit?  NO             Estimated Call Time:_____4:15 PM  ______________ Date/Time:_______________

## 2021-04-12 ENCOUNTER — TRANSCRIBE ORDER (OUTPATIENT)
Dept: SCHEDULING | Age: 86
End: 2021-04-12

## 2021-04-12 ENCOUNTER — HOSPITAL ENCOUNTER (OUTPATIENT)
Dept: ULTRASOUND IMAGING | Age: 86
Discharge: HOME | End: 2021-04-12
Attending: FAMILY MEDICINE
Payer: MEDICARE

## 2021-04-12 DIAGNOSIS — M79.662 PAIN OF LEFT CALF: ICD-10-CM

## 2021-04-12 DIAGNOSIS — M79.662 PAIN OF LEFT CALF: Primary | ICD-10-CM

## 2021-04-12 PROCEDURE — 93971 EXTREMITY STUDY: CPT

## 2021-11-16 ENCOUNTER — TRANSCRIBE ORDER (OUTPATIENT)
Dept: SCHEDULING | Age: 86
End: 2021-11-16

## 2021-11-16 DIAGNOSIS — I82.532 CHRONIC EMBOLISM AND THROMBOSIS OF LEFT POPLITEAL VEIN (HCC): Primary | ICD-10-CM

## 2021-11-23 ENCOUNTER — HOSPITAL ENCOUNTER (OUTPATIENT)
Dept: ULTRASOUND IMAGING | Age: 86
Discharge: HOME | End: 2021-11-23
Attending: FAMILY MEDICINE
Payer: MEDICARE

## 2021-11-23 DIAGNOSIS — I82.532 CHRONIC EMBOLISM AND THROMBOSIS OF LEFT POPLITEAL VEIN (HCC): ICD-10-CM

## 2021-11-23 PROCEDURE — 93970 EXTREMITY STUDY: CPT

## 2022-01-10 ENCOUNTER — OFFICE VISIT (OUTPATIENT)
Dept: SURGERY | Age: 87
End: 2022-01-10
Payer: MEDICARE

## 2022-01-10 VITALS
BODY MASS INDEX: 26.7 KG/M2 | SYSTOLIC BLOOD PRESSURE: 103 MMHG | RESPIRATION RATE: 18 BRPM | OXYGEN SATURATION: 94 % | HEART RATE: 90 BPM | DIASTOLIC BLOOD PRESSURE: 70 MMHG | WEIGHT: 136 LBS | TEMPERATURE: 97.9 F | HEIGHT: 60 IN

## 2022-01-10 DIAGNOSIS — K40.90 RIGHT INGUINAL HERNIA: Primary | ICD-10-CM

## 2022-01-10 DIAGNOSIS — K43.2 INCISIONAL HERNIA, WITHOUT OBSTRUCTION OR GANGRENE: ICD-10-CM

## 2022-01-10 PROCEDURE — G8427 DOCREV CUR MEDS BY ELIG CLIN: HCPCS | Performed by: SURGERY

## 2022-01-10 PROCEDURE — 99203 OFFICE O/P NEW LOW 30 MIN: CPT | Performed by: SURGERY

## 2022-01-10 PROCEDURE — 1101F PT FALLS ASSESS-DOCD LE1/YR: CPT | Performed by: SURGERY

## 2022-01-10 PROCEDURE — G8536 NO DOC ELDER MAL SCRN: HCPCS | Performed by: SURGERY

## 2022-01-10 PROCEDURE — G8400 PT W/DXA NO RESULTS DOC: HCPCS | Performed by: SURGERY

## 2022-01-10 PROCEDURE — 1090F PRES/ABSN URINE INCON ASSESS: CPT | Performed by: SURGERY

## 2022-01-10 PROCEDURE — G8510 SCR DEP NEG, NO PLAN REQD: HCPCS | Performed by: SURGERY

## 2022-01-10 PROCEDURE — G8419 CALC BMI OUT NRM PARAM NOF/U: HCPCS | Performed by: SURGERY

## 2022-01-10 RX ORDER — LOSARTAN POTASSIUM 50 MG/1
50 TABLET ORAL
COMMUNITY

## 2022-01-10 RX ORDER — ASPIRIN 81 MG/1
81 TABLET ORAL
COMMUNITY

## 2022-01-10 RX ORDER — LANOLIN ALCOHOL/MO/W.PET/CERES
1000 CREAM (GRAM) TOPICAL
COMMUNITY

## 2022-01-10 RX ORDER — ACETAMINOPHEN 500 MG
500 TABLET ORAL AS NEEDED
COMMUNITY

## 2022-01-10 RX ORDER — ALENDRONATE SODIUM 70 MG/1
70 TABLET ORAL
COMMUNITY

## 2022-01-10 NOTE — PROGRESS NOTES
1. Have you been to the ER, urgent care clinic since your last visit? Hospitalized since your last visit? Seen at [facility] last Wednesday for abdominal pain. 2. Have you seen or consulted any other health care providers outside of the [address] [place] [address] since your last visit? Include any pap smears or colon screening.  [facility]

## 2022-01-10 NOTE — H&P (VIEW-ONLY)
[facility] Specialists at [facility] Surgery History and Physical    History of Present Illness:      Claudene Cumins is a 82 y.o. female who has a right inguinal hernia and a left abdominal wall hernia. She was recently in the emergency room due to severe right groin pain. She was found to have a right inguinal hernia and also a left mid anterior abdominal wall hernia as well. She has been having some pain in the right groin periodically. She is taking stool softeners sometimes to help with it. She does not describe any pain of the left abdominal wall or abdomen. She has not had any bowel obstructions. She does have a history of a back surgery where they did an anterior and posterior approach so she has a lower midline incision related to that surgery which is likely the cause of the left anterior abdominal wall hernia. She was also found to have a large paraesophageal hernia with most of the stomach in the chest but she has no symptoms no reflux and no dysphagia currently. Past Medical History:   Diagnosis Date    Arthritis     OSTEO, SPINE    Cancer ([facility] [address] 2001    BASAL CELL NOSE    Chronic pain     BACK    Diabetes ([facility] [address]     BORDERLINE, NIDDM    GERD (gastroesophageal reflux disease)     HIATAL HERNIA    Liver disease 1960    HEP A       Past Surgical History:   Procedure Laterality Date    HX CHOLECYSTECTOMY  2008    HX RALPH AND BSO  1995    HX TONSILLECTOMY      CHILD         Current Outpatient Medications:     losartan (COZAAR) 50 mg tablet, Take 50 mg by mouth two (2) times daily (after meals). , Disp: , Rfl:     alendronate (FOSAMAX) 70 mg tablet, Take 70 mg by mouth every seven (7) days. , Disp: , Rfl:     SITagliptin (Januvia) 50 mg tablet, Take 50 mg by mouth daily. , Disp: , Rfl:     acetaminophen (Tylenol Extra Strength) 500 mg tablet, Take 500 mg by mouth every six (6) hours as needed for Pain., Disp: , Rfl:     aspirin delayed-release 81 mg tablet, Take  by mouth daily., Disp: , Rfl:     cyanocobalamin 1,000 mcg tablet, Take 1,000 mcg by mouth daily. , Disp: , Rfl:     oxybutynin chloride (DITROPAN XL) 5 mg CR tablet, Take 2 Tabs by mouth daily. , Disp: 60 Tab, Rfl: 0    cholecalciferol, vitamin d3, (VITAMIN D) 1,000 unit tablet, Take 1,000 Units by mouth daily. 4 TAB DAILY, Disp: , Rfl:     guaiFENesin-codeine (ROBITUSSIN AC) 100-10 mg/5 mL solution, Take 5 mL by mouth three (3) times daily as needed for Cough. Max Daily Amount: 15 mL. (Patient not taking: Reported on 1/10/2022), Disp: 120 mL, Rfl: 0    pantoprazole (PROTONIX) 40 mg tablet, Take 1 Tab by mouth daily. (Patient not taking: Reported on 1/10/2022), Disp: 30 Tab, Rfl: 0    omeprazole (PRILOSEC) 20 mg capsule, Take 1 Cap by mouth daily. (Patient not taking: Reported on 1/10/2022), Disp: 30 Cap, Rfl: 0    RISEDRONATE SODIUM (ACTONEL PO), Take  by mouth every seven (7) days. (Patient not taking: Reported on 1/10/2022), Disp: , Rfl:     GLIPIZIDE PO, Take  by mouth daily (before dinner). (Patient not taking: Reported on 1/10/2022), Disp: , Rfl:     calcium-vitamin D (CALCIUM 500+D) 500 mg(1,250mg) -200 unit per tablet, Take 1 Tab by mouth daily. (Patient not taking: Reported on 1/10/2022), Disp: , Rfl:     celecoxib (CELEBREX) 200 mg capsule, Take 200 mg by mouth daily as needed. (Patient not taking: Reported on 1/10/2022), Disp: , Rfl:     No Known Allergies    Social History     Socioeconomic History    Marital status:      Spouse name: Not on file    Number of children: Not on file    Years of education: Not on file    Highest education level: Not on file   Occupational History    Not on file   Tobacco Use    Smoking status: Never Smoker    Smokeless tobacco: Never Used   Substance and Sexual Activity    Alcohol use:  Yes     Alcohol/week: 2.0 standard drinks     Types: 1 Glasses of wine, 1 Shots of liquor per week     Comment: 7 DRINKS WEEK    Drug use: No    Sexual activity: Not on file Other Topics Concern    Not on file   Social History Narrative    Not on file     Social Determinants of Health     Financial Resource Strain:     Difficulty of Paying Living Expenses: Not on file   Food Insecurity:     Worried About Running Out of Food in the Last Year: Not on file    Nadege of Food in the Last Year: Not on file   Transportation Needs:     Lack of Transportation (Medical): Not on file    Lack of Transportation (Non-Medical):  Not on file   Physical Activity:     Days of Exercise per Week: Not on file    Minutes of Exercise per Session: Not on file   Stress:     Feeling of Stress : Not on file   Social Connections:     Frequency of Communication with Friends and Family: Not on file    Frequency of Social Gatherings with Friends and Family: Not on file    Attends Mu-ism Services: Not on file    Active Member of [address] [place] [address] or Organizations: Not on file    Attends Club or Organization Meetings: Not on file    Marital Status: Not on file   Intimate Partner Violence:     Fear of Current or Ex-Partner: Not on file    Emotionally Abused: Not on file    Physically Abused: Not on file    Sexually Abused: Not on file   Housing Stability:     Unable to Pay for Housing in the Last Year: Not on file    Number of [place] in the Last Year: Not on file    Unstable Housing in the Last Year: Not on file       Family History   Problem Relation Age of Onset    Cancer Mother         COLON    Cancer Father         PANCREATIC    Other Brother         VASCULAR DISEASE    Heart Disease Brother         valve replacement       ROS   Constitutional: negative  Ears, Nose, Mouth, Throat, and Face: negative  Respiratory: negative  Cardiovascular: negative  Gastrointestinal: Right groin pain and bulge  Genitourinary:negative  Integument/Breast: negative  Hematologic/Lymphatic: negative  Behavioral/Psychiatric: negative  Allergic/Immunologic: negative      Physical Exam:     Visit Vitals  /70 (BP 1 Location: Right arm, BP Patient Position: Sitting, BP Cuff Size: Small adult)   Pulse 90   Temp 97.9 °F (36.6 °C) (Oral)   Resp 18   Ht 5' (1.524 m)   Wt 136 lb (61.7 kg)   SpO2 94%   BMI 26.56 kg/m²       General - alert and oriented, no apparent distress  HEENT - no jaundice, no hearing imparement  Pulm - CTAB, no C/W/R  CV - RRR, no M/R/G  Abd -soft, nondistended, bowel sounds present, right inguinal hernia present that is soft and reducible, medium size, no palpable hernia of the left abdominal wall as far as I can feel, lower midline incision  Ext - pulses intact in UE and LE bilaterally, no edema  Skin - supple, no rashes  Psychiatric - normal affect, good mood    Labs  Lab Results   Component Value Date/Time    Sodium 144 11/08/2017 05:10 AM    Potassium 4.0 11/08/2017 05:10 AM    Chloride 109 (H) 11/08/2017 05:10 AM    CO2 27 11/08/2017 05:10 AM    Anion gap 8 11/08/2017 05:10 AM    Glucose 117 (H) 11/08/2017 05:10 AM    BUN 17 11/08/2017 05:10 AM    Creatinine 0.83 11/08/2017 05:10 AM    BUN/Creatinine ratio 20 11/08/2017 05:10 AM    GFR est AA >60 11/08/2017 05:10 AM    GFR est non-AA >60 11/08/2017 05:10 AM    Calcium 8.1 (L) 11/08/2017 05:10 AM    Bilirubin, total 0.9 11/08/2017 05:10 AM    Alk. phosphatase 120 (H) 11/08/2017 05:10 AM    Protein, total 7.2 11/08/2017 05:10 AM    Albumin 3.6 11/08/2017 05:10 AM    Globulin 3.6 11/08/2017 05:10 AM    A-G Ratio 1.0 (L) 11/08/2017 05:10 AM    ALT (SGPT) 42 11/08/2017 05:10 AM    AST (SGOT) 100 (H) 11/08/2017 05:10 AM     Lab Results   Component Value Date/Time    WBC 12.3 (H) 11/08/2017 05:10 AM    Hemoglobin (POC) 15.6 03/06/2012 11:45 AM    HGB 13.6 11/08/2017 05:10 AM    Hematocrit (POC) 46 03/06/2012 11:45 AM    HCT 42.6 11/08/2017 05:10 AM    PLATELET 118 80/64/6965 05:10 AM    MCV 87.1 11/08/2017 05:10 AM         Imaging  CT from his regular doctors-large paraesophageal hernia containing the entire stomach.   Stomach with some organoaxial rotation but no evidence of gastric outlet obstruction stable from previous CT scan. Left anterior lateral abdominal wall hernia measuring about 2-1/2 to 3 cm through a portion of the abdominal wall but not going through the external oblique containing small bowel. Right inguinal hernia containing colon without any incarceration or obstruction  I have reviewed and agree with all of the pertinent images    Assessment:     Patricio Santos is a 82 y.o. female with right inguinal hernia and left abdominal wall incisional hernia    Recommendations:     1.   the patient does have a symptomatic right inguinal hernia. She seems to be most symptomatic from this hernia. It is medium size and is reducible on exam.  She will certainly need surgery for this. I will plan on doing a robotic right inguinal hernia repair with mesh. She also has this left abdominal wall hernia which is partially through the oblique muscles. The external oblique muscle is intact. The defect is about 2-1/2 to 3 cm in size. This is likely related to her previous preperitoneal approach to her spine surgery. I will plan on a robotic incisional hernia repair for this hernia as well either preperitoneal or with a IPOM style repair. I will also likely need to double duct for the hernia repair due to the hernias being on opposite sides. She appears to be in good health otherwise and will schedule surgery. I will plan an admission for obs after surgery. I have discussed the above procedure with the patient in detail. We reviewed the benefits and possible complications of the surgery which include bleeding, infection, damage to adjacent organs, venous thromboembolism, need for repeat surgery, death and other unforseen complications. The patient agreed to proceed with the surgery. Cristina Stern MD    Greater than half of the time: 30 minutes was used in counciling the patient about diagnosis and treatment plan    Ms. Ashok Valencia has a reminder for a \"due or due soon\" health maintenance. I have asked that she contact her primary care provider for follow-up on this health maintenance.

## 2022-01-10 NOTE — PROGRESS NOTES
[facility] Surgical Specialists at [facility] Surgery History and Physical    History of Present Illness:      Greyson Jiménez is a 82 y.o. female who has a right inguinal hernia and a left abdominal wall hernia. She was recently in the emergency room due to severe right groin pain. She was found to have a right inguinal hernia and also a left mid anterior abdominal wall hernia as well. She has been having some pain in the right groin periodically. She is taking stool softeners sometimes to help with it. She does not describe any pain of the left abdominal wall or abdomen. She has not had any bowel obstructions. She does have a history of a back surgery where they did an anterior and posterior approach so she has a lower midline incision related to that surgery which is likely the cause of the left anterior abdominal wall hernia. She was also found to have a large paraesophageal hernia with most of the stomach in the chest but she has no symptoms no reflux and no dysphagia currently. 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 , Disp: , Rfl:     alendronate (FOSAMAX) 70 mg tablet, Take 70 mg by mouth every seven (7) days. , Disp: , Rfl:     SITagliptin (Januvia) 50 mg tablet, Take 50 mg by mouth daily. , Disp: , Rfl:     acetaminophen (Tylenol Extra Strength) 500 mg tablet, Take 500 mg by mouth every six (6) hours as needed for Pain., Disp: , Rfl:     aspirin delayed-release 81 mg tablet, Take  by mouth daily., Disp: , Rfl:     cyanocobalamin 1,000 mcg tablet, Take 1,000 mcg by mouth daily. , Disp: , Rfl:     oxybutynin chloride (DITROPAN XL) 5 mg CR tablet, Take 2 Tabs by mouth daily. , Disp: 60 Tab, Rfl: 0    cholecalciferol, vitamin d3, (VITAMIN D) 1,000 unit tablet, Take 1,000 Units by mouth daily. 4 TAB DAILY, Disp: , Rfl:     guaiFENesin-codeine (ROBITUSSIN AC) 100-10 mg/5 mL solution, Take 5 mL by mouth three (3) times daily as needed for Cough. Max Daily Amount: 15 mL. (Patient not taking: Reported on 1/10/2022), Disp: 120 mL, Rfl: 0    pantoprazole (PROTONIX) 40 mg tablet, Take 1 Tab by mouth daily. (Patient not taking: Reported on 1/10/2022), Disp: 30 Tab, Rfl: 0    omeprazole (PRILOSEC) 20 mg capsule, Take 1 Cap by mouth daily. (Patient not taking: Reported on 1/10/2022), Disp: 30 Cap, Rfl: 0    RISEDRONATE SODIUM (ACTONEL PO), Take  by mouth every seven (7) days. (Patient not taking: Reported on 1/10/2022), Disp: , Rfl:     GLIPIZIDE PO, Take  by mouth daily (before dinner). (Patient not taking: Reported on 1/10/2022), Disp: , Rfl:     calcium-vitamin D (CALCIUM 500+D) 500 mg(1,250mg) -200 unit per tablet, Take 1 Tab by mouth daily. (Patient not taking: Reported on 1/10/2022), Disp: , Rfl:     celecoxib (CELEBREX) 200 mg capsule, Take 200 mg by mouth daily as needed. (Patient not taking: Reported on 1/10/2022), Disp: , Rfl:     No Known Allergies    Social History     Socioeconomic History    Marital status:      Spouse name: Not on file    Number of children: Not on file    Years of education: Not on file    Highest education level: Not on file   Occupational History    Not on file   Tobacco Use    Smoking status: Never Smoker    Smokeless tobacco: Never Used   Substance and Sexual Activity    Alcohol use:  Yes     Alcohol/week: 2.0 standard drinks     Types: 1 Glasses of wine, 1 Shots of liquor per week     Comment: 7 DRINKS WEEK    Drug use: No    Sexual activity: Not on file Other Topics Concern    Not on file   Social History Narrative    Not on file     Social Determinants of Health     Financial Resource Strain:     Difficulty of Paying Living Expenses: Not on file   Food Insecurity:     Worried About Running Out of Food in the Last Year: Not on file    Nadege of Food in the Last Year: Not on file   Transportation Needs:     Lack of Transportation (Medical): Not on file    Lack of Transportation (Non-Medical):  Not on file   Physical Activity:     Days of Exercise per Week: Not on file    Minutes of Exercise per Session: Not on file   Stress:     Feeling of Stress : Not on file   Social Connections:     Frequency of Communication with Friends and Family: Not on file    Frequency of Social Gatherings with Friends and Family: Not on file    Attends Anglican Services: Not on file    Active Member of [address] [place] [address] or Organizations: Not on file    Attends Club or Organization Meetings: Not on file    Marital Status: Not on file   Intimate Partner Violence:     Fear of Current or Ex-Partner: Not on file    Emotionally Abused: Not on file    Physically Abused: Not on file    Sexually Abused: Not on file   Housing Stability:     Unable to Pay for Housing in the Last Year: Not on file    Number of [place] in the Last Year: Not on file    Unstable Housing in the Last Year: Not on file       Family History   Problem Relation Age of Onset    Cancer Mother         COLON    Cancer Father         PANCREATIC    Other Brother         VASCULAR DISEASE    Heart Disease Brother         valve replacement       ROS   Constitutional: negative  Ears, Nose, Mouth, Throat, and Face: negative  Respiratory: negative  Cardiovascular: negative  Gastrointestinal: Right groin pain and bulge  Genitourinary:negative  Integument/Breast: negative  Hematologic/Lymphatic: negative  Behavioral/Psychiatric: negative  Allergic/Immunologic: negative      Physical Exam:     Visit Vitals  /70 (BP 1 Location: Right arm, BP Patient Position: Sitting, BP Cuff Size: Small adult)   Pulse 90   Temp 97.9 °F (36.6 °C) (Oral)   Resp 18   Ht 5' (1.524 m)   Wt 136 lb (61.7 kg)   SpO2 94%   BMI 26.56 kg/m²       General - alert and oriented, no apparent distress  HEENT - no jaundice, no hearing imparement  Pulm - CTAB, no C/W/R  CV - RRR, no M/R/G  Abd -soft, nondistended, bowel sounds present, right inguinal hernia present that is soft and reducible, medium size, no palpable hernia of the left abdominal wall as far as I can feel, lower midline incision  Ext - pulses intact in UE and LE bilaterally, no edema  Skin - supple, no rashes  Psychiatric - normal affect, good mood    Labs  Lab Results   Component Value Date/Time    Sodium 144 11/08/2017 05:10 AM    Potassium 4.0 11/08/2017 05:10 AM    Chloride 109 (H) 11/08/2017 05:10 AM    CO2 27 11/08/2017 05:10 AM    Anion gap 8 11/08/2017 05:10 AM    Glucose 117 (H) 11/08/2017 05:10 AM    BUN 17 11/08/2017 05:10 AM    Creatinine 0.83 11/08/2017 05:10 AM    BUN/Creatinine ratio 20 11/08/2017 05:10 AM    GFR est AA >60 11/08/2017 05:10 AM    GFR est non-AA >60 11/08/2017 05:10 AM    Calcium 8.1 (L) 11/08/2017 05:10 AM    Bilirubin, total 0.9 11/08/2017 05:10 AM    Alk. phosphatase 120 (H) 11/08/2017 05:10 AM    Protein, total 7.2 11/08/2017 05:10 AM    Albumin 3.6 11/08/2017 05:10 AM    Globulin 3.6 11/08/2017 05:10 AM    A-G Ratio 1.0 (L) 11/08/2017 05:10 AM    ALT (SGPT) 42 11/08/2017 05:10 AM    AST (SGOT) 100 (H) 11/08/2017 05:10 AM     Lab Results   Component Value Date/Time    WBC 12.3 (H) 11/08/2017 05:10 AM    Hemoglobin (POC) 15.6 03/06/2012 11:45 AM    HGB 13.6 11/08/2017 05:10 AM    Hematocrit (POC) 46 03/06/2012 11:45 AM    HCT 42.6 11/08/2017 05:10 AM    PLATELET 598 48/20/4805 05:10 AM    MCV 87.1 11/08/2017 05:10 AM         Imaging  CT from his regular doctors-large paraesophageal hernia containing the entire stomach.   Stomach with some organoaxial rotation but no evidence of gastric outlet obstruction stable from previous CT scan. Left anterior lateral abdominal wall hernia measuring about 2-1/2 to 3 cm through a portion of the abdominal wall but not going through the external oblique containing small bowel. Right inguinal hernia containing colon without any incarceration or obstruction  I have reviewed and agree with all of the pertinent images    Assessment:     Gabriel Juarez is a 82 y.o. female with right inguinal hernia and left abdominal wall incisional hernia    Recommendations:     1.   the patient does have a symptomatic right inguinal hernia. She seems to be most symptomatic from this hernia. It is medium size and is reducible on exam.  She will certainly need surgery for this. I will plan on doing a robotic right inguinal hernia repair with mesh. She also has this left abdominal wall hernia which is partially through the oblique muscles. The external oblique muscle is intact. The defect is about 2-1/2 to 3 cm in size. This is likely related to her previous preperitoneal approach to her spine surgery. I will plan on a robotic incisional hernia repair for this hernia as well either preperitoneal or with a IPOM style repair. I will also likely need to double duct for the hernia repair due to the hernias being on opposite sides. She appears to be in good health otherwise and will schedule surgery. I will plan an admission for obs after surgery. I have discussed the above procedure with the patient in detail. We reviewed the benefits and possible complications of the surgery which include bleeding, infection, damage to adjacent organs, venous thromboembolism, need for repeat surgery, death and other unforseen complications. The patient agreed to proceed with the surgery. Heide Randolph MD    Greater than half of the time: 30 minutes was used in counciling the patient about diagnosis and treatment plan    Ms. Nohemy Peters has a reminder for a \"due or due soon\" health maintenance. I have asked that she contact her primary care provider for follow-up on this health maintenance.

## 2022-01-10 NOTE — LETTER
1/10/2022    Patient: Edson Cassidy   YOB: 1935   Date of Visit: 1/10/2022     Molina Rivera MD  [place] [address]  [address] [facility] [address]  Via Fax: [phone]    Dear Molina Rivera MD,      Thank you for referring Ms. Connor Floyd to [facility] for evaluation. My notes for this consultation are attached. If you have questions, please do not hesitate to call me. I look forward to following your patient along with you.       Sincerely,    Lizzette Beckford MD

## 2022-01-12 NOTE — PERIOP NOTES
PAT APPT MADE FOR 1/18/22 AT 1500; COVID TESTING WILL BE DONE AT [facility] AND PT WILL BRING A HARD COPY OF RESULTS ON DOS.

## 2022-01-13 ENCOUNTER — TRANSCRIBE ORDER (OUTPATIENT)
Dept: REGISTRATION | Age: 87
End: 2022-01-13

## 2022-01-13 DIAGNOSIS — Z01.812 PRE-PROCEDURE LAB EXAM: Primary | ICD-10-CM

## 2022-01-13 NOTE — PERIOP NOTES
PATIENT AND HER  CALLED AND WANTS TO MAKE A COVID APPOINTMENT WITH [facility] . PATIENT CALLED AND MADE AWARE OF COVID-19 TESTING NEEDED TO BE DONE PRIOR TO SURGERY. COVID-19 TESTING APPOINTMENT MADE FOR PATIENT. PATIENT INSTRUCTED ON SELF QUARANTINE BETWEEN TESTING AND ARRIVAL TIME DAY OF SURGERY.

## 2022-01-17 ENCOUNTER — HOSPITAL ENCOUNTER (OUTPATIENT)
Dept: PREADMISSION TESTING | Age: 87
Discharge: HOME | End: 2022-01-17
Attending: SURGERY
Payer: MEDICARE

## 2022-01-17 DIAGNOSIS — Z01.812 PRE-PROCEDURE LAB EXAM: ICD-10-CM

## 2022-01-17 PROCEDURE — U0005 INFEC AGEN DETEC AMPLI PROBE: HCPCS

## 2022-01-17 PROCEDURE — 36415 COLL VENOUS BLD VENIPUNCTURE: CPT

## 2022-01-18 ENCOUNTER — HOSPITAL ENCOUNTER (OUTPATIENT)
Dept: PREADMISSION TESTING | Age: 87
Discharge: HOME | End: 2022-01-18
Payer: MEDICARE

## 2022-01-18 VITALS
HEIGHT: 58 IN | HEART RATE: 75 BPM | SYSTOLIC BLOOD PRESSURE: 139 MMHG | DIASTOLIC BLOOD PRESSURE: 85 MMHG | WEIGHT: 134.7 LBS | TEMPERATURE: 97.4 F | BODY MASS INDEX: 28.28 KG/M2

## 2022-01-18 LAB
ANION GAP SERPL CALC-SCNC: 5 MMOL/L (ref 5–15)
ATRIAL RATE: 66 BPM
BASOPHILS # BLD: 0 K/UL (ref 0–0.1)
BASOPHILS NFR BLD: 1 % (ref 0–1)
BUN SERPL-MCNC: 23 MG/DL (ref 6–20)
BUN/CREAT SERPL: 27 (ref 12–20)
CALCIUM SERPL-MCNC: 9.4 MG/DL (ref 8.5–10.1)
CALCULATED P AXIS, ECG09: 39 DEGREES
CALCULATED R AXIS, ECG10: -20 DEGREES
CALCULATED T AXIS, ECG11: 47 DEGREES
CHLORIDE SERPL-SCNC: 105 MMOL/L (ref 97–108)
CO2 SERPL-SCNC: 30 MMOL/L (ref 21–32)
CREAT SERPL-MCNC: 0.86 MG/DL (ref 0.55–1.02)
DIAGNOSIS, 93000: NORMAL
DIFFERENTIAL METHOD BLD: ABNORMAL
EOSINOPHIL # BLD: 0.1 K/UL (ref 0–0.4)
EOSINOPHIL NFR BLD: 1 % (ref 0–7)
ERYTHROCYTE [DISTWIDTH] IN BLOOD BY AUTOMATED COUNT: 13.9 % (ref 11.5–14.5)
EST. AVERAGE GLUCOSE BLD GHB EST-MCNC: 120 MG/DL
GLUCOSE SERPL-MCNC: 99 MG/DL (ref 65–100)
HBA1C MFR BLD: 5.8 % (ref 4–5.6)
HCT VFR BLD AUTO: 47.6 % (ref 35–47)
HGB BLD-MCNC: 15.4 G/DL (ref 11.5–16)
IMM GRANULOCYTES # BLD AUTO: 0 K/UL (ref 0–0.04)
IMM GRANULOCYTES NFR BLD AUTO: 1 % (ref 0–0.5)
LYMPHOCYTES # BLD: 1.4 K/UL (ref 0.8–3.5)
LYMPHOCYTES NFR BLD: 16 % (ref 12–49)
MCH RBC QN AUTO: 31.2 PG (ref 26–34)
MCHC RBC AUTO-ENTMCNC: 32.4 G/DL (ref 30–36.5)
MCV RBC AUTO: 96.4 FL (ref 80–99)
MONOCYTES # BLD: 0.8 K/UL (ref 0–1)
MONOCYTES NFR BLD: 9 % (ref 5–13)
NEUTS SEG # BLD: 6.2 K/UL (ref 1.8–8)
NEUTS SEG NFR BLD: 72 % (ref 32–75)
NRBC # BLD: 0 K/UL (ref 0–0.01)
NRBC BLD-RTO: 0 PER 100 WBC
P-R INTERVAL, ECG05: 148 MS
PLATELET # BLD AUTO: 183 K/UL (ref 150–400)
PMV BLD AUTO: 11.1 FL (ref 8.9–12.9)
POTASSIUM SERPL-SCNC: 4.2 MMOL/L (ref 3.5–5.1)
Q-T INTERVAL, ECG07: 414 MS
QRS DURATION, ECG06: 68 MS
QTC CALCULATION (BEZET), ECG08: 434 MS
RBC # BLD AUTO: 4.94 M/UL (ref 3.8–5.2)
SARS-COV-2, XPLCVT: NOT DETECTED
SODIUM SERPL-SCNC: 140 MMOL/L (ref 136–145)
SOURCE, COVRS: NORMAL
VENTRICULAR RATE, ECG03: 66 BPM
WBC # BLD AUTO: 8.5 K/UL (ref 3.6–11)

## 2022-01-18 PROCEDURE — 83036 HEMOGLOBIN GLYCOSYLATED A1C: CPT

## 2022-01-18 PROCEDURE — 93005 ELECTROCARDIOGRAM TRACING: CPT

## 2022-01-18 PROCEDURE — 80048 BASIC METABOLIC PNL TOTAL CA: CPT

## 2022-01-18 PROCEDURE — 85025 COMPLETE CBC W/AUTO DIFF WBC: CPT

## 2022-01-18 NOTE — PERIOP NOTES
[address] INSTRUCTIONS    Surgery Date:   01/20/2022    [facility] staff will call you between 4 PM- 8 PM the day before surgery with your arrival time. If your surgery is on a Monday, we will call you the preceding Friday. Please call [phone] after 8 PM if you did not receive your arrival time. 1. Please report to [facility] Patient Access/Admitting on the 1st floor. Bring your insurance card, photo identification, and any copayment ( if applicable). 2. If you are going home the same day of your surgery, you must have a responsible adult to drive you home. You need to have a responsible adult to stay with you the first 24 hours after surgery and you should not drive a car for 24 hours following your surgery. 3. Do NOT eat any solid foods after midnight the night before surgery including candy, mint or gum. You may drink clear liquids from midnight until 1 hour prior to your arrival. You may drink up to 12 ounces at one time every 4 hours. Please note special instructions, if applicable, below for medications. 4. Do NOT drink alcohol or smoke 24 hours before surgery. STOP smoking for 14 days prior as it helps with breathing and healing after surgery. 5. If you are being admitted to the hospital, please leave personal belongings/luggage in your car until you have an assigned hospital room number. 6. Please wear comfortable clothes. Wear your glasses instead of contacts. We ask that all money, jewelry and valuables be left at home. Wear no make up, particularly mascara, the day of surgery. 7.  All body piercings, rings, and jewelry need to be removed and left at home. Please remove any nail polish or artifical nails from your fingernails. Please wear your hair loose or down. Please no pony-tails, buns, or any metal hair accessories. If you shower the morning of surgery, please do not apply any lotions or powders afterwards. You may wear deodorant, unless having breast surgery.   Do not shave any body area within 24 hours of your surgery. 8. Please follow all instructions to avoid any potential surgical cancellation. 9. Should your physical condition change, (i.e. fever, cold, flu, etc.) please notify your surgeon as soon as possible. 10. It is important to be on time. If a situation occurs where you may be delayed, please call:  [phone] / [phone] on the day of surgery. 11. The Preadmission Testing staff can be reached at [phone]. 12. Special instructions: N/A      Current Outpatient Medications   Medication Sig    losartan (COZAAR) 50 mg tablet Take 50 mg by mouth two (2) times daily (after meals).  alendronate (FOSAMAX) 70 mg tablet Take 70 mg by mouth every seven (7) days. TAKES ON SUNDAY    SITagliptin (Januvia) 50 mg tablet Take 50 mg by mouth every morning.  acetaminophen (Tylenol Extra Strength) 500 mg tablet Take 500 mg by mouth as needed for Pain.  aspirin delayed-release 81 mg tablet Take 81 mg by mouth every morning.  cyanocobalamin 1,000 mcg tablet Take 1,000 mcg by mouth every morning.  oxybutynin chloride (DITROPAN XL) 5 mg CR tablet Take 2 Tabs by mouth daily. (Patient taking differently: Take 10 mg by mouth every morning.)    cholecalciferol, vitamin d3, (VITAMIN D) 1,000 unit tablet Take 4,000 Units by mouth every morning. 4 TAB DAILY     No current facility-administered medications for this encounter. 1. YOU MUST ONLY TAKE THESE MEDICATIONS THE MORNING OF SURGERY WITH A SIP OF WATER: N/A  2. MEDICATIONS TO TAKE THE MORNING OF SURGERY ONLY IF NEEDED: N/A  3. HOLD these medications BEFORE Surgery: LOSARTAN, VITAMIN D3, OXBUTYNIN, SITAGLIPTIN  4. Ask your surgeon/prescribing physician about when/if to STOP taking these medications: ASPIRIN  5. Stop all vitamins, herbal medicines and Aspirin containing products 7 days prior to surgery.  Stop any non-steroidal anti-inflammatory drugs (i.e. Ibuprofen, Naproxen, Advil, Aleve) 3 days before surgery. You may take Tylenol. 6. If you are currently taking Plavix, Coumadin,or any other blood-thinning/anticoagulant medication contact your prescribing physician for instructions. Eating and Drinking Before Surgery     You may eat a regular dinner at the usual time on the day before your surgery.  Do NOT eat any solid foods after midnight unless your arrival time at the hospital is 3pm or later.  You may drink clear liquids only from 12 midnight until 1 hours prior to your arrival time at the hospital on the day of your surgery. Do NOT drink alcohol.  Clear liquids include:  o Water  o Fruit juices without pulp( i.e. apple juice)  o Carbonated beverages  o Black coffee (no cream/milk)  o Tea (no cream/milk)  o Gatorade   You may drink up to 12-16 ounces at one time every 4 hours between the hours of midnight and 1 hour before your arrival time at the hospital. Example- if your arrival time at the hospital is 6am, you may drink 12-16 ounces of clear liquids no later than 5am.   If your arrival time at the hospital is 3pm or later, you may eat a light breakfast before 8am.   A light breakfast includes:  o Toast or bagel (no butter)  o Black coffee (no cream/milk)  o Tea (no cream/milk)  o Fruit juices without pulp ( i.e. apple juice)  o Do NOT eat meat, eggs, vegetables or fruit   If you have any questions, please contact your surgeon's office. Preventing Infections Before and After  Your Surgery    IMPORTANT INSTRUCTIONS    Please read and follow these instructions carefully. If you are unable to comply with the below instructions your procedure will be cancelled. You play an important role in your health and preparation for surgery. To reduce the germs on your skin you will need to shower with CHG soap (Chorhexidine gluconate 4%) two times before surgery.     CHG soap (Hibiclens, Hex-A-Clens or store brand)   CHG soap will be provided at your Preadmission Testing (PAT) appointment.  If you do not have a PAT appointment before surgery, you may arrange to  CHG soap from our office or purchase CHG soap at a pharmacy, grocery or department store.  You need to purchase TWO 4 ounce bottles to use for your 2 showers. Steps to follow:  1. Wash your hair with your normal shampoo and your body with regular soap and rinse well to remove shampoo and soap from your skin. 2. Wet a clean washcloth and turn off the shower. 3. Put CHG soap on washcloth and apply to your entire body from the neck down. Do not use on your head, face or private parts(genitals). Do not use CHG soap on open sores, wounds or areas of skin irritation. 4. Wash you body gently for 5 minutes. Do not wash your skin too hard. This soap does not create lather. Pay special attention to your underarms and from your belly button to your feet. 5. Turn the shower back on and rinse well to get CHG soap off your body. 6. Pat your skin dry with a clean, dry towel. Do not apply lotions or moisturizer. 7. Put on clean clothes and sleep on fresh bed sheets and do not allow pets to sleep with you. Shower with CHG soap 2 times before your surgery   The evening before your surgery   The morning of your surgery      Tips to help prevent infections after your surgery:  1. Protect your surgical wound from germs:  ? Hand washing is the most important thing you and your caregivers can do to prevent infections. ? Keep your bandage clean and dry! ? Do not touch your surgical wound. 2. Use clean, freshly washed towels and washcloths every time you shower; do not share bath linens with others. 3. Until your surgical wound is healed, wear clothing and sleep on bed linens each day that are clean and freshly washed. 4. Do not allow pets to sleep in your bed with you or touch your surgical wound. 5. Do not smoke  smoking delays wound healing. This may be a good time to stop smoking.   6. If you have diabetes, it is important for you to manage your blood sugar levels properly before your surgery as well as after your surgery. Poorly managed blood sugar levels slow down wound healing and prevent you from healing completely. [facility]   Instructions for Pre-Surgery COVID-19 Testing     Across our ministry we have established standard guidelines to ensure the health and safety of our patients, residents and associates as we resume elective services for patients. All patients presenting for surgery are required to have a COVID-19 test result within 96 hours of their scheduled surgery. [address] [place] [address] is providing this test free of charge to the patient. Instructions for COVID-19 Testing:     Patients will receive a call from Pre-Admission Testing 4-5 days prior to surgery to schedule a date and time to come to the [address] [place] [address] Drive for their COVID-19 test   Patients are advised to self-quarantine after testing until their scheduled surgery   Once on site, patients will be registered and receive COVID test in their vehicle   If a patient is scheduled for normal Pre Admission Testing 96 hours from date of surgery, the patient will still have their COVID test done at the [address] [place] [address] located at [address] [place] [address] Positive results will be shared with the surgeon and anesthesiologist and may result in cancellation of the elective procedure    Testing Hours and Location:   Address:  [address] [place] [address] Up Pre Admission 11 [facility] in the Discharge Lot on [facility] (Map Attached)  174 [place] [facility], [address]   Hours: Monday- Friday 7a-3p    PAT Phone Number: [phone]              Patient Information Regarding COVID Restrictions    Patients are advised to self-quarantine after COVID testing up to the day of the scheduled procedure.     Day of Procedure     Please park in the parking deck or any designated visitor parking lot.   Enter the facility through the Main Entrance of the hospital.   A temperature check and appropriate symptom/exposure screening will be done prior to entry to the facility.  On the day of surgery, please provide the cell phone number of the person who will be waiting for you to the Patient Access representative at the time of registration.  Please wear a mask on the day of your procedure.  We are now allowing one designated visitor per stay. Pediatric patients may have 2 designated visitors. This one person may come in with you on the day of your procedure.  No visitors under the age of 13.  The designated visitor must also wear a mask.  Once your procedure and the immediate recovery period is completed, a nurse in the recovery area will contact your designated visitor to inform them of your room number or to otherwise review other pertinent information regarding your care.  Social distancing practices are to be adhered to in waiting areas and the cafeteria. The patient and  was contacted in person. They verbalized understanding of all instructions does not  need reinforcement.

## 2022-01-19 ENCOUNTER — ANESTHESIA EVENT (OUTPATIENT)
Dept: SURGERY | Age: 87
End: 2022-01-19
Payer: MEDICARE

## 2022-01-20 ENCOUNTER — ANESTHESIA (OUTPATIENT)
Dept: SURGERY | Age: 87
End: 2022-01-20
Payer: MEDICARE

## 2022-01-20 ENCOUNTER — HOSPITAL ENCOUNTER (OUTPATIENT)
Age: 87
Setting detail: OBSERVATION
Discharge: HOME | End: 2022-01-22
Attending: SURGERY | Admitting: SURGERY
Payer: MEDICARE

## 2022-01-20 DIAGNOSIS — K40.20 NON-RECURRENT BILATERAL INGUINAL HERNIA WITHOUT OBSTRUCTION OR GANGRENE: ICD-10-CM

## 2022-01-20 DIAGNOSIS — K43.2 INCISIONAL HERNIA, WITHOUT OBSTRUCTION OR GANGRENE: ICD-10-CM

## 2022-01-20 LAB
GLUCOSE BLD STRIP.AUTO-MCNC: 151 MG/DL (ref 65–117)
SERVICE CMNT-IMP: ABNORMAL

## 2022-01-20 PROCEDURE — 49650 LAP ING HERNIA REPAIR INIT: CPT | Performed by: SURGERY

## 2022-01-20 PROCEDURE — 74011250636 HC RX REV CODE- 250/636: Performed by: SURGERY

## 2022-01-20 PROCEDURE — 77030008684 HC TU ET CUF COVD -B: Performed by: ANESTHESIOLOGY

## 2022-01-20 PROCEDURE — 76010000878 HC OR TIME 3 TO 3.5HR INTENSV - TIER 2: Performed by: SURGERY

## 2022-01-20 PROCEDURE — 77030016151 HC PROTCTR LNS DFOG COVD -B: Performed by: SURGERY

## 2022-01-20 PROCEDURE — G0378 HOSPITAL OBSERVATION PER HR: HCPCS

## 2022-01-20 PROCEDURE — 82962 GLUCOSE BLOOD TEST: CPT

## 2022-01-20 PROCEDURE — 74011000250 HC RX REV CODE- 250: Performed by: NURSE ANESTHETIST, CERTIFIED REGISTERED

## 2022-01-20 PROCEDURE — 74011000250 HC RX REV CODE- 250: Performed by: SURGERY

## 2022-01-20 PROCEDURE — 77030022704 HC SUT VLOC COVD -B: Performed by: SURGERY

## 2022-01-20 PROCEDURE — 74011250637 HC RX REV CODE- 250/637: Performed by: STUDENT IN AN ORGANIZED HEALTH CARE EDUCATION/TRAINING PROGRAM

## 2022-01-20 PROCEDURE — 77030035464 HC SUT VLOC NON ABS COVD -B: Performed by: SURGERY

## 2022-01-20 PROCEDURE — 77030040361 HC SLV COMPR DVT MDII -B: Performed by: SURGERY

## 2022-01-20 PROCEDURE — 77030013079 HC BLNKT BAIR HGGR 3M -A: Performed by: ANESTHESIOLOGY

## 2022-01-20 PROCEDURE — 77030018813 HC SCIS LAPSCP EPIX DISP AMR -B: Performed by: SURGERY

## 2022-01-20 PROCEDURE — 77030040356 HC CORD BPLR FRCP COVD -A: Performed by: SURGERY

## 2022-01-20 PROCEDURE — 77030002895 HC DEV VASC CLOSR COVD -B: Performed by: SURGERY

## 2022-01-20 PROCEDURE — 77030002933 HC SUT MCRYL J&J -A: Performed by: SURGERY

## 2022-01-20 PROCEDURE — C1781 MESH (IMPLANTABLE): HCPCS | Performed by: SURGERY

## 2022-01-20 PROCEDURE — 76210000019 HC OR PH I REC 4 TO 4.5 HR: Performed by: SURGERY

## 2022-01-20 PROCEDURE — 77030031139 HC SUT VCRL2 J&J -A: Performed by: SURGERY

## 2022-01-20 PROCEDURE — 2709999900 HC NON-CHARGEABLE SUPPLY: Performed by: SURGERY

## 2022-01-20 PROCEDURE — 77030026438 HC STYL ET INTUB CARD -A: Performed by: ANESTHESIOLOGY

## 2022-01-20 PROCEDURE — 77030040922 HC BLNKT HYPOTHRM STRY -A

## 2022-01-20 PROCEDURE — 77030035277 HC OBTRTR BLDELSS DISP INTU -B: Performed by: SURGERY

## 2022-01-20 PROCEDURE — 74011250636 HC RX REV CODE- 250/636: Performed by: NURSE ANESTHETIST, CERTIFIED REGISTERED

## 2022-01-20 PROCEDURE — 74011250636 HC RX REV CODE- 250/636: Performed by: STUDENT IN AN ORGANIZED HEALTH CARE EDUCATION/TRAINING PROGRAM

## 2022-01-20 PROCEDURE — 77030010507 HC ADH SKN DERMBND J&J -B: Performed by: SURGERY

## 2022-01-20 PROCEDURE — 49654 PR LAP, INCISIONAL HERNIA REPAIR,REDUCIBLE: CPT | Performed by: SURGERY

## 2022-01-20 PROCEDURE — 76060000038 HC ANESTHESIA 3.5 TO 4 HR: Performed by: SURGERY

## 2022-01-20 PROCEDURE — 77030035489 HC REDUCR CANN ENDOWR INTU -C: Performed by: SURGERY

## 2022-01-20 DEVICE — VENTRALIGHT ST MESH WITH ECHO PS POSITIONING SYSTEM, 4.5" (11.4 CM), CIRCLE
Type: IMPLANTABLE DEVICE | Site: ABDOMEN | Status: FUNCTIONAL
Brand: VENTRALIGHT

## 2022-01-20 DEVICE — LAPAROSCOPIC SELF-FIXATING MESH, RIGHT ANATOMICAL
Type: IMPLANTABLE DEVICE | Site: INGUINAL | Status: FUNCTIONAL
Brand: PROGRIP

## 2022-01-20 DEVICE — LAPAROSCOPIC SELF-FIXATING MESH, LEFT ANATOMICAL
Type: IMPLANTABLE DEVICE | Site: INGUINAL | Status: FUNCTIONAL
Brand: PROGRIP

## 2022-01-20 RX ORDER — HYDROMORPHONE HYDROCHLORIDE 1 MG/ML
0.2 INJECTION, SOLUTION INTRAMUSCULAR; INTRAVENOUS; SUBCUTANEOUS
Status: DISCONTINUED | OUTPATIENT
Start: 2022-01-20 | End: 2022-01-20 | Stop reason: HOSPADM

## 2022-01-20 RX ORDER — ENOXAPARIN SODIUM 100 MG/ML
40 INJECTION SUBCUTANEOUS EVERY 24 HOURS
Status: DISCONTINUED | OUTPATIENT
Start: 2022-01-21 | End: 2022-01-22 | Stop reason: HOSPADM

## 2022-01-20 RX ORDER — SODIUM CHLORIDE 0.9 % (FLUSH) 0.9 %
5-40 SYRINGE (ML) INJECTION EVERY 8 HOURS
Status: DISCONTINUED | OUTPATIENT
Start: 2022-01-20 | End: 2022-01-20 | Stop reason: HOSPADM

## 2022-01-20 RX ORDER — SODIUM CHLORIDE, SODIUM LACTATE, POTASSIUM CHLORIDE, CALCIUM CHLORIDE 600; 310; 30; 20 MG/100ML; MG/100ML; MG/100ML; MG/100ML
125 INJECTION, SOLUTION INTRAVENOUS CONTINUOUS
Status: DISCONTINUED | OUTPATIENT
Start: 2022-01-20 | End: 2022-01-20 | Stop reason: HOSPADM

## 2022-01-20 RX ORDER — ONDANSETRON 2 MG/ML
4 INJECTION INTRAMUSCULAR; INTRAVENOUS
Status: DISCONTINUED | OUTPATIENT
Start: 2022-01-20 | End: 2022-01-22 | Stop reason: HOSPADM

## 2022-01-20 RX ORDER — ONDANSETRON 2 MG/ML
INJECTION INTRAMUSCULAR; INTRAVENOUS AS NEEDED
Status: DISCONTINUED | OUTPATIENT
Start: 2022-01-20 | End: 2022-01-20 | Stop reason: HOSPADM

## 2022-01-20 RX ORDER — MORPHINE SULFATE 2 MG/ML
2 INJECTION, SOLUTION INTRAMUSCULAR; INTRAVENOUS
Status: DISCONTINUED | OUTPATIENT
Start: 2022-01-20 | End: 2022-01-20 | Stop reason: HOSPADM

## 2022-01-20 RX ORDER — PROPOFOL 10 MG/ML
INJECTION, EMULSION INTRAVENOUS AS NEEDED
Status: DISCONTINUED | OUTPATIENT
Start: 2022-01-20 | End: 2022-01-20 | Stop reason: HOSPADM

## 2022-01-20 RX ORDER — ONDANSETRON 2 MG/ML
4 INJECTION INTRAMUSCULAR; INTRAVENOUS AS NEEDED
Status: DISCONTINUED | OUTPATIENT
Start: 2022-01-20 | End: 2022-01-20 | Stop reason: HOSPADM

## 2022-01-20 RX ORDER — LOSARTAN POTASSIUM 50 MG/1
50 TABLET ORAL
Status: DISCONTINUED | OUTPATIENT
Start: 2022-01-21 | End: 2022-01-22 | Stop reason: HOSPADM

## 2022-01-20 RX ORDER — SODIUM CHLORIDE 0.9 % (FLUSH) 0.9 %
5-40 SYRINGE (ML) INJECTION AS NEEDED
Status: DISCONTINUED | OUTPATIENT
Start: 2022-01-20 | End: 2022-01-22 | Stop reason: HOSPADM

## 2022-01-20 RX ORDER — SODIUM CHLORIDE, SODIUM LACTATE, POTASSIUM CHLORIDE, CALCIUM CHLORIDE 600; 310; 30; 20 MG/100ML; MG/100ML; MG/100ML; MG/100ML
75 INJECTION, SOLUTION INTRAVENOUS CONTINUOUS
Status: DISCONTINUED | OUTPATIENT
Start: 2022-01-20 | End: 2022-01-21

## 2022-01-20 RX ORDER — SODIUM CHLORIDE 9 MG/ML
1000 INJECTION, SOLUTION INTRAVENOUS CONTINUOUS
Status: DISCONTINUED | OUTPATIENT
Start: 2022-01-20 | End: 2022-01-20 | Stop reason: HOSPADM

## 2022-01-20 RX ORDER — HYDROMORPHONE HYDROCHLORIDE 1 MG/ML
0.5 INJECTION, SOLUTION INTRAMUSCULAR; INTRAVENOUS; SUBCUTANEOUS
Status: DISCONTINUED | OUTPATIENT
Start: 2022-01-20 | End: 2022-01-22 | Stop reason: HOSPADM

## 2022-01-20 RX ORDER — FENTANYL CITRATE 50 UG/ML
25 INJECTION, SOLUTION INTRAMUSCULAR; INTRAVENOUS
Status: COMPLETED | OUTPATIENT
Start: 2022-01-20 | End: 2022-01-20

## 2022-01-20 RX ORDER — NALOXONE HYDROCHLORIDE 0.4 MG/ML
0.4 INJECTION, SOLUTION INTRAMUSCULAR; INTRAVENOUS; SUBCUTANEOUS AS NEEDED
Status: DISCONTINUED | OUTPATIENT
Start: 2022-01-20 | End: 2022-01-22 | Stop reason: HOSPADM

## 2022-01-20 RX ORDER — OXYCODONE AND ACETAMINOPHEN 5; 325 MG/1; MG/1
1 TABLET ORAL AS NEEDED
Status: DISCONTINUED | OUTPATIENT
Start: 2022-01-20 | End: 2022-01-20 | Stop reason: HOSPADM

## 2022-01-20 RX ORDER — INSULIN LISPRO 100 [IU]/ML
INJECTION, SOLUTION INTRAVENOUS; SUBCUTANEOUS
Status: DISCONTINUED | OUTPATIENT
Start: 2022-01-20 | End: 2022-01-22 | Stop reason: HOSPADM

## 2022-01-20 RX ORDER — FENTANYL CITRATE 50 UG/ML
INJECTION, SOLUTION INTRAMUSCULAR; INTRAVENOUS AS NEEDED
Status: DISCONTINUED | OUTPATIENT
Start: 2022-01-20 | End: 2022-01-20 | Stop reason: HOSPADM

## 2022-01-20 RX ORDER — DEXTROSE 50 % IN WATER (D50W) INTRAVENOUS SYRINGE
12.5-25 AS NEEDED
Status: DISCONTINUED | OUTPATIENT
Start: 2022-01-20 | End: 2022-01-22 | Stop reason: HOSPADM

## 2022-01-20 RX ORDER — DIPHENHYDRAMINE HYDROCHLORIDE 50 MG/ML
12.5 INJECTION, SOLUTION INTRAMUSCULAR; INTRAVENOUS AS NEEDED
Status: DISCONTINUED | OUTPATIENT
Start: 2022-01-20 | End: 2022-01-20 | Stop reason: HOSPADM

## 2022-01-20 RX ORDER — SODIUM CHLORIDE 0.9 % (FLUSH) 0.9 %
5-40 SYRINGE (ML) INJECTION EVERY 8 HOURS
Status: DISCONTINUED | OUTPATIENT
Start: 2022-01-20 | End: 2022-01-22 | Stop reason: HOSPADM

## 2022-01-20 RX ORDER — OXYCODONE AND ACETAMINOPHEN 5; 325 MG/1; MG/1
1 TABLET ORAL
Qty: 30 TABLET | Refills: 0 | Status: SHIPPED | OUTPATIENT
Start: 2022-01-20 | End: 2022-01-27

## 2022-01-20 RX ORDER — MAGNESIUM SULFATE 100 %
4 CRYSTALS MISCELLANEOUS AS NEEDED
Status: DISCONTINUED | OUTPATIENT
Start: 2022-01-20 | End: 2022-01-22 | Stop reason: HOSPADM

## 2022-01-20 RX ORDER — OXYCODONE AND ACETAMINOPHEN 5; 325 MG/1; MG/1
1-2 TABLET ORAL
Status: DISCONTINUED | OUTPATIENT
Start: 2022-01-20 | End: 2022-01-22 | Stop reason: HOSPADM

## 2022-01-20 RX ORDER — EPHEDRINE SULFATE/0.9% NACL/PF 50 MG/5 ML
5 SYRINGE (ML) INTRAVENOUS AS NEEDED
Status: DISCONTINUED | OUTPATIENT
Start: 2022-01-20 | End: 2022-01-20 | Stop reason: HOSPADM

## 2022-01-20 RX ORDER — SODIUM CHLORIDE 9 MG/ML
125 INJECTION, SOLUTION INTRAVENOUS CONTINUOUS
Status: DISCONTINUED | OUTPATIENT
Start: 2022-01-20 | End: 2022-01-20 | Stop reason: HOSPADM

## 2022-01-20 RX ORDER — ACETAMINOPHEN 500 MG
1000 TABLET ORAL ONCE
Status: COMPLETED | OUTPATIENT
Start: 2022-01-20 | End: 2022-01-20

## 2022-01-20 RX ORDER — BUPIVACAINE HYDROCHLORIDE 5 MG/ML
INJECTION, SOLUTION EPIDURAL; INTRACAUDAL AS NEEDED
Status: DISCONTINUED | OUTPATIENT
Start: 2022-01-20 | End: 2022-01-20 | Stop reason: HOSPADM

## 2022-01-20 RX ORDER — SODIUM CHLORIDE 0.9 % (FLUSH) 0.9 %
5-40 SYRINGE (ML) INJECTION AS NEEDED
Status: DISCONTINUED | OUTPATIENT
Start: 2022-01-20 | End: 2022-01-20 | Stop reason: HOSPADM

## 2022-01-20 RX ORDER — LIDOCAINE HYDROCHLORIDE 20 MG/ML
INJECTION, SOLUTION EPIDURAL; INFILTRATION; INTRACAUDAL; PERINEURAL AS NEEDED
Status: DISCONTINUED | OUTPATIENT
Start: 2022-01-20 | End: 2022-01-20 | Stop reason: HOSPADM

## 2022-01-20 RX ORDER — KETOROLAC TROMETHAMINE 30 MG/ML
15 INJECTION, SOLUTION INTRAMUSCULAR; INTRAVENOUS EVERY 6 HOURS
Status: DISCONTINUED | OUTPATIENT
Start: 2022-01-20 | End: 2022-01-22 | Stop reason: HOSPADM

## 2022-01-20 RX ORDER — FENTANYL CITRATE 50 UG/ML
50 INJECTION, SOLUTION INTRAMUSCULAR; INTRAVENOUS AS NEEDED
Status: DISCONTINUED | OUTPATIENT
Start: 2022-01-20 | End: 2022-01-20 | Stop reason: HOSPADM

## 2022-01-20 RX ORDER — DEXAMETHASONE SODIUM PHOSPHATE 4 MG/ML
INJECTION, SOLUTION INTRA-ARTICULAR; INTRALESIONAL; INTRAMUSCULAR; INTRAVENOUS; SOFT TISSUE AS NEEDED
Status: DISCONTINUED | OUTPATIENT
Start: 2022-01-20 | End: 2022-01-20 | Stop reason: HOSPADM

## 2022-01-20 RX ORDER — OXYBUTYNIN CHLORIDE 5 MG/1
10 TABLET, EXTENDED RELEASE ORAL DAILY
Status: DISCONTINUED | OUTPATIENT
Start: 2022-01-21 | End: 2022-01-22 | Stop reason: HOSPADM

## 2022-01-20 RX ORDER — MIDAZOLAM HYDROCHLORIDE 1 MG/ML
1 INJECTION, SOLUTION INTRAMUSCULAR; INTRAVENOUS AS NEEDED
Status: DISCONTINUED | OUTPATIENT
Start: 2022-01-20 | End: 2022-01-20 | Stop reason: HOSPADM

## 2022-01-20 RX ORDER — IBUPROFEN 800 MG/1
800 TABLET ORAL
Qty: 30 TABLET | Refills: 0 | Status: SHIPPED | OUTPATIENT
Start: 2022-01-20

## 2022-01-20 RX ORDER — LIDOCAINE HYDROCHLORIDE 10 MG/ML
0.1 INJECTION, SOLUTION EPIDURAL; INFILTRATION; INTRACAUDAL; PERINEURAL AS NEEDED
Status: DISCONTINUED | OUTPATIENT
Start: 2022-01-20 | End: 2022-01-20 | Stop reason: HOSPADM

## 2022-01-20 RX ORDER — ROCURONIUM BROMIDE 10 MG/ML
INJECTION, SOLUTION INTRAVENOUS AS NEEDED
Status: DISCONTINUED | OUTPATIENT
Start: 2022-01-20 | End: 2022-01-20 | Stop reason: HOSPADM

## 2022-01-20 RX ORDER — MIDAZOLAM HYDROCHLORIDE 1 MG/ML
0.5 INJECTION, SOLUTION INTRAMUSCULAR; INTRAVENOUS
Status: DISCONTINUED | OUTPATIENT
Start: 2022-01-20 | End: 2022-01-20 | Stop reason: HOSPADM

## 2022-01-20 RX ORDER — HYDROMORPHONE HYDROCHLORIDE 2 MG/ML
INJECTION, SOLUTION INTRAMUSCULAR; INTRAVENOUS; SUBCUTANEOUS AS NEEDED
Status: DISCONTINUED | OUTPATIENT
Start: 2022-01-20 | End: 2022-01-20 | Stop reason: HOSPADM

## 2022-01-20 RX ORDER — DIPHENHYDRAMINE HYDROCHLORIDE 50 MG/ML
12.5 INJECTION, SOLUTION INTRAMUSCULAR; INTRAVENOUS
Status: ACTIVE | OUTPATIENT
Start: 2022-01-20 | End: 2022-01-21

## 2022-01-20 RX ORDER — SODIUM CHLORIDE, SODIUM LACTATE, POTASSIUM CHLORIDE, CALCIUM CHLORIDE 600; 310; 30; 20 MG/100ML; MG/100ML; MG/100ML; MG/100ML
1000 INJECTION, SOLUTION INTRAVENOUS CONTINUOUS
Status: DISCONTINUED | OUTPATIENT
Start: 2022-01-20 | End: 2022-01-20 | Stop reason: HOSPADM

## 2022-01-20 RX ADMIN — HYDROMORPHONE HYDROCHLORIDE 0.2 MG: 2 INJECTION, SOLUTION INTRAMUSCULAR; INTRAVENOUS; SUBCUTANEOUS at 13:33

## 2022-01-20 RX ADMIN — FENTANYL CITRATE 25 MCG: 50 INJECTION, SOLUTION INTRAMUSCULAR; INTRAVENOUS at 11:50

## 2022-01-20 RX ADMIN — PROPOFOL 100 MG: 10 INJECTION, EMULSION INTRAVENOUS at 11:22

## 2022-01-20 RX ADMIN — ROCURONIUM BROMIDE 10 MG: 10 SOLUTION INTRAVENOUS at 12:55

## 2022-01-20 RX ADMIN — ROCURONIUM BROMIDE 10 MG: 10 SOLUTION INTRAVENOUS at 11:49

## 2022-01-20 RX ADMIN — SODIUM CHLORIDE, POTASSIUM CHLORIDE, SODIUM LACTATE AND CALCIUM CHLORIDE 125 ML/HR: 600; 310; 30; 20 INJECTION, SOLUTION INTRAVENOUS at 09:33

## 2022-01-20 RX ADMIN — HYDROMORPHONE HYDROCHLORIDE 0.5 MG: 1 INJECTION, SOLUTION INTRAMUSCULAR; INTRAVENOUS; SUBCUTANEOUS at 19:27

## 2022-01-20 RX ADMIN — ONDANSETRON HYDROCHLORIDE 4 MG: 2 INJECTION, SOLUTION INTRAMUSCULAR; INTRAVENOUS at 14:17

## 2022-01-20 RX ADMIN — ROCURONIUM BROMIDE 40 MG: 10 SOLUTION INTRAVENOUS at 11:22

## 2022-01-20 RX ADMIN — SUGAMMADEX 100 MG: 100 INJECTION, SOLUTION INTRAVENOUS at 14:24

## 2022-01-20 RX ADMIN — FENTANYL CITRATE 25 MCG: 50 INJECTION, SOLUTION INTRAMUSCULAR; INTRAVENOUS at 11:58

## 2022-01-20 RX ADMIN — SODIUM CHLORIDE, POTASSIUM CHLORIDE, SODIUM LACTATE AND CALCIUM CHLORIDE: 600; 310; 30; 20 INJECTION, SOLUTION INTRAVENOUS at 14:19

## 2022-01-20 RX ADMIN — FENTANYL CITRATE 25 MCG: 50 INJECTION, SOLUTION INTRAMUSCULAR; INTRAVENOUS at 15:05

## 2022-01-20 RX ADMIN — DEXAMETHASONE SODIUM PHOSPHATE 4 MG: 4 INJECTION, SOLUTION INTRAMUSCULAR; INTRAVENOUS at 11:35

## 2022-01-20 RX ADMIN — HYDROMORPHONE HYDROCHLORIDE 0.2 MG: 1 INJECTION, SOLUTION INTRAMUSCULAR; INTRAVENOUS; SUBCUTANEOUS at 18:34

## 2022-01-20 RX ADMIN — HYDROMORPHONE HYDROCHLORIDE 0.2 MG: 2 INJECTION, SOLUTION INTRAMUSCULAR; INTRAVENOUS; SUBCUTANEOUS at 14:28

## 2022-01-20 RX ADMIN — FENTANYL CITRATE 25 MCG: 50 INJECTION, SOLUTION INTRAMUSCULAR; INTRAVENOUS at 15:00

## 2022-01-20 RX ADMIN — FENTANYL CITRATE 25 MCG: 50 INJECTION, SOLUTION INTRAMUSCULAR; INTRAVENOUS at 15:20

## 2022-01-20 RX ADMIN — ROCURONIUM BROMIDE 10 MG: 10 SOLUTION INTRAVENOUS at 12:09

## 2022-01-20 RX ADMIN — WATER 2 G: 1 INJECTION INTRAMUSCULAR; INTRAVENOUS; SUBCUTANEOUS at 11:35

## 2022-01-20 RX ADMIN — FENTANYL CITRATE 25 MCG: 50 INJECTION, SOLUTION INTRAMUSCULAR; INTRAVENOUS at 15:30

## 2022-01-20 RX ADMIN — LIDOCAINE HYDROCHLORIDE 60 MG: 20 INJECTION, SOLUTION EPIDURAL; INFILTRATION; INTRACAUDAL; PERINEURAL at 11:22

## 2022-01-20 RX ADMIN — FENTANYL CITRATE 50 MCG: 50 INJECTION, SOLUTION INTRAMUSCULAR; INTRAVENOUS at 11:22

## 2022-01-20 RX ADMIN — ACETAMINOPHEN 1000 MG: 500 TABLET ORAL at 09:33

## 2022-01-20 RX ADMIN — HYDROMORPHONE HYDROCHLORIDE 0.2 MG: 2 INJECTION, SOLUTION INTRAMUSCULAR; INTRAVENOUS; SUBCUTANEOUS at 12:09

## 2022-01-20 RX ADMIN — HYDROMORPHONE HYDROCHLORIDE 0.2 MG: 1 INJECTION, SOLUTION INTRAMUSCULAR; INTRAVENOUS; SUBCUTANEOUS at 18:19

## 2022-01-20 NOTE — ANESTHESIA POSTPROCEDURE EVALUATION
Post-Anesthesia Evaluation and Assessment    Patient: Gurdeep Arredondo MRN: [id number]  SSN: [id number]    YOB: 1935  Age: 82 y.o. Sex: female      I have evaluated the patient and they are stable and ready for discharge from the PACU. Cardiovascular Function/Vital Signs  Visit Vitals  BP (!) 144/86 (BP 1 Location: Right upper arm, BP Patient Position: At rest)   Pulse 85   Temp 36.7 °C (98 °F)   Resp 15   Ht 4' 10\" (1.473 m)   Wt 61.1 kg (134 lb 11.2 oz)   SpO2 95%   BMI 28.15 kg/m²       Patient is status post General anesthesia for Procedure(s):  ROBOTIC ASSISTED LEFT ABDOMINAL WALL INCISIONAL HERNIA REPAIR WITH MESH  AND ROBOTIC ASSISTED LAPRASCOPIC BILATERAL INGUINAL HERNIA REPAIRl WITH MESH. Nausea/Vomiting: None    Postoperative hydration reviewed and adequate. Pain:  Pain Scale 1: Numeric (0 - 10) (01/20/22 1529)  Pain Intensity 1: 6 (01/20/22 1529)   Managed    Neurological Status:   Neuro (WDL): Exceptions to WDL (01/20/22 1535)  Neuro  Neurologic State: Drowsy (01/20/22 1535)  LUE Motor Response: Purposeful (01/20/22 1535)  LLE Motor Response: Purposeful (01/20/22 1535)  RUE Motor Response: Purposeful (01/20/22 1535)  RLE Motor Response: Purposeful (01/20/22 1535)   At baseline    Mental Status, Level of Consciousness: Alert and  oriented to person, place, and time    Pulmonary Status:   O2 Device: Nasal cannula (01/20/22 1535)   Adequate oxygenation and airway patent    Complications related to anesthesia: None    Post-anesthesia assessment completed.  No concerns    Signed By: Jesus Ernst DO     January 20, 2022

## 2022-01-20 NOTE — INTERVAL H&P NOTE
Update History & Physical      The surgery was reviewed with the patient and I examined the patient. There was no change. The surgical site was confirmed by the patient and me. Plan:  The risk, benefits, expected outcome, and alternative to the recommended procedure have been discussed with the patient. Patient understands and wants to proceed with the procedure.     Electronically signed by Jaye Jalloh MD on 1/20/2022 at 10:59 AM

## 2022-01-20 NOTE — ANESTHESIA PREPROCEDURE EVALUATION
Relevant Problems   No relevant active problems       Anesthetic History   No history of anesthetic complications            Review of Systems / Medical History  Patient summary reviewed, nursing notes reviewed and pertinent labs reviewed    Pulmonary  Within defined limits                 Neuro/Psych             Comments: Mild memory loss Cardiovascular    Hypertension              Exercise tolerance: >4 METS     GI/Hepatic/Renal     GERD           Endo/Other    Diabetes: well controlled, type 2    Arthritis     Other Findings              Physical Exam    Airway  Mallampati: II  TM Distance: 4 - 6 cm  Neck ROM: normal range of motion   Mouth opening: Normal     Cardiovascular  Regular rate and rhythm,  S1 and S2 normal,  no murmur, click, rub, or gallop             Dental    Dentition: Caps/crowns, Upper dentition intact and Lower dentition intact     Pulmonary  Breath sounds clear to auscultation               Abdominal  GI exam deferred       Other Findings            Anesthetic Plan    ASA: 2  Anesthesia type: general          Induction: Intravenous  Anesthetic plan and risks discussed with: Patient

## 2022-01-20 NOTE — DISCHARGE INSTRUCTIONS
[address] [facility] Surgery at [address] [place] [address] Road Operative Instructions    Please call your surgeons  office for a 2 week follow-up appointment with Dr Thalia Ramos . Office address is: [facility] [place] GUERRIER/ Bradford De Oliveira [address], [address] [place] [address]  [phone]      Discharge Instructions: You may resume your usual diet as well as your usual medications. You are not cleared to drive if you are taking narcotic pain medication. If you are only using tylenol for pain and are comfortable sitting in a car, you may drive. No heavy lifting. No strenuous exercise. You are cleared to go up and down stairs. You may shower but no baths or swimming. Your incisions dont need any special care. You can wash them gently with  warm soap and water daily and pat them dry. Your stitches are under the skin and dont need to be removed. Ask you doctor when you can return to work. Call our office at  [phone] to make a 2 week follow up appointment. Call our office with any problems, questions or concerns. Call our office if you have any     Fevers of 101 or higher   Worsening pain  Nausea/Vomiting  Difficulty eating or drinking  Incisions that are red, open, draining or tender.

## 2022-01-20 NOTE — BRIEF OP NOTE
Brief Postoperative Note    Patient: Demetrios Mcburney  YOB: 1935  MRN: [id number]    Date of Procedure: 1/20/2022     Pre-Op Diagnosis: RIGHT INGUINAL HERNIA   INCISIONAL HERNIA    Post-Op Diagnosis: Bilateral inguinal hernias and left side abdominal wall incisional hernia      Procedure(s):  ROBOTIC ASSISTED LEFT ABDOMINAL WALL INCISIONAL HERNIA REPAIR WITH MESH  AND ROBOTIC ASSISTED LAPRASCOPIC BILATERAL INGUINAL HERNIA REPAIRS WITH MESH    Surgeon(s):  Cecilio Aschoff, MD    Surgical Assistant: Surg Asst-1: Artist Thuy    Anesthesia: General     Estimated Blood Loss (mL): less than 50     Complications: None    Specimens: * No specimens in log *     Implants:   Implant Name Type Inv.  Item Serial No.  Lot No. LRB No. Used Action   MESH SLF-FLT PROGRIP RT -- PROGRIP - SNA  MESH SLF-FLT PROGRIP RT -- PROGRIP NA COVIDIEN  SURGICAL [id number] Right 1 Implanted   MESH SLF-FLT PROGRIP LT -- PROGRIP - SNA  MESH SLF-FLT PROGRIP LT -- PROGRIP NA COVIDIEN US SURGICAL [id number] Left 1 Implanted   MESH W/ECHO PS 11.4CM CIR -- VENTRALIGHT ORDER BY CA - SNA  MESH W/ECHO PS 11.4CM CIR -- VENTRALIGHT ORDER BY CA NA BARD DAVOL_WD [id number] N/A 1 Implanted       Drains: * No LDAs found *    Findings: medium size RIH direct, small direct LIH, both repaired with [id number] parietex progrip mesh placed preperitoneal, 2.5x2cm incisional hernia of the left lower abdominal wall near inguinal region, repaired IPOM style with 11cm round Bard Ventralyte ST mesh and primary repair    Electronically Signed by Santy Lawton MD on 1/20/2022 at 2:21 PM

## 2022-01-20 NOTE — PERIOP NOTES
TRANSFER - OUT REPORT:    Verbal report given to Kathie PISANO(name) on Pedro Luis Vargas  being transferred to 519(unit) for routine post - op       Report consisted of patients Situation, Background, Assessment and   Recommendations(SBAR). Time Pre op antibiotic given:1135  Anesthesia Stop time: 9723  Priest Present on Transfer to floor:yes  Order for Priest on Chart:yes  Discharge Prescriptions with Chart:no    Information from the following report(s) SBAR, OR Summary, Procedure Summary, Intake/Output, MAR, Recent Results, Med Rec Status and Cardiac Rhythm nsr was reviewed with the receiving nurse. Opportunity for questions and clarification was provided. Is the patient on 02? YES       L/Min 1       Other nc    Is the patient on a monitor? NO    Is the nurse transporting with the patient? NO    Surgical Waiting Area notified of patient's transfer from PACU? YES      The following personal items collected during your admission accompanied patient upon transfer:   Dental Appliance: Dental Appliances: None  Vision: Visual Aid: Glasses  Hearing Aid:    Jewelry:    Clothing: Clothing: Other (comment) (CLOTHING & SHOES TO PACU)  Other Valuables:  Other Valuables: Eyeglasses,Cane (GLASSES TO PACU; CANE GIVEN TO Macho Braden)  Valuables sent to safe:

## 2022-01-21 LAB
ALBUMIN SERPL-MCNC: 3.5 G/DL (ref 3.5–5)
ALBUMIN/GLOB SERPL: 1 {RATIO} (ref 1.1–2.2)
ALP SERPL-CCNC: 75 U/L (ref 45–117)
ALT SERPL-CCNC: 48 U/L (ref 12–78)
ANION GAP SERPL CALC-SCNC: 4 MMOL/L (ref 5–15)
AST SERPL-CCNC: 52 U/L (ref 15–37)
BASOPHILS # BLD: 0 K/UL (ref 0–0.1)
BASOPHILS NFR BLD: 0 % (ref 0–1)
BILIRUB SERPL-MCNC: 0.7 MG/DL (ref 0.2–1)
BUN SERPL-MCNC: 24 MG/DL (ref 6–20)
BUN/CREAT SERPL: 30 (ref 12–20)
CALCIUM SERPL-MCNC: 8.7 MG/DL (ref 8.5–10.1)
CHLORIDE SERPL-SCNC: 107 MMOL/L (ref 97–108)
CO2 SERPL-SCNC: 27 MMOL/L (ref 21–32)
CREAT SERPL-MCNC: 0.81 MG/DL (ref 0.55–1.02)
DIFFERENTIAL METHOD BLD: ABNORMAL
EOSINOPHIL # BLD: 0 K/UL (ref 0–0.4)
EOSINOPHIL NFR BLD: 0 % (ref 0–7)
ERYTHROCYTE [DISTWIDTH] IN BLOOD BY AUTOMATED COUNT: 14.1 % (ref 11.5–14.5)
GLOBULIN SER CALC-MCNC: 3.6 G/DL (ref 2–4)
GLUCOSE BLD STRIP.AUTO-MCNC: 110 MG/DL (ref 65–117)
GLUCOSE BLD STRIP.AUTO-MCNC: 111 MG/DL (ref 65–117)
GLUCOSE BLD STRIP.AUTO-MCNC: 124 MG/DL (ref 65–117)
GLUCOSE BLD STRIP.AUTO-MCNC: 96 MG/DL (ref 65–117)
GLUCOSE SERPL-MCNC: 144 MG/DL (ref 65–100)
HCT VFR BLD AUTO: 43.3 % (ref 35–47)
HGB BLD-MCNC: 13.9 G/DL (ref 11.5–16)
IMM GRANULOCYTES # BLD AUTO: 0 K/UL (ref 0–0.04)
IMM GRANULOCYTES NFR BLD AUTO: 0 % (ref 0–0.5)
LYMPHOCYTES # BLD: 0.5 K/UL (ref 0.8–3.5)
LYMPHOCYTES NFR BLD: 3 % (ref 12–49)
MCH RBC QN AUTO: 31.4 PG (ref 26–34)
MCHC RBC AUTO-ENTMCNC: 32.1 G/DL (ref 30–36.5)
MCV RBC AUTO: 97.7 FL (ref 80–99)
MONOCYTES # BLD: 1.1 K/UL (ref 0–1)
MONOCYTES NFR BLD: 6 % (ref 5–13)
NEUTS SEG # BLD: 16.4 K/UL (ref 1.8–8)
NEUTS SEG NFR BLD: 91 % (ref 32–75)
NRBC # BLD: 0 K/UL (ref 0–0.01)
NRBC BLD-RTO: 0 PER 100 WBC
PLATELET # BLD AUTO: 194 K/UL (ref 150–400)
PMV BLD AUTO: 11.1 FL (ref 8.9–12.9)
POTASSIUM SERPL-SCNC: 4.6 MMOL/L (ref 3.5–5.1)
PROT SERPL-MCNC: 7.1 G/DL (ref 6.4–8.2)
RBC # BLD AUTO: 4.43 M/UL (ref 3.8–5.2)
RBC MORPH BLD: ABNORMAL
RBC MORPH BLD: ABNORMAL
SERVICE CMNT-IMP: ABNORMAL
SERVICE CMNT-IMP: NORMAL
SODIUM SERPL-SCNC: 138 MMOL/L (ref 136–145)
WBC # BLD AUTO: 18 K/UL (ref 3.6–11)

## 2022-01-21 PROCEDURE — 74011000250 HC RX REV CODE- 250: Performed by: SURGERY

## 2022-01-21 PROCEDURE — 96374 THER/PROPH/DIAG INJ IV PUSH: CPT

## 2022-01-21 PROCEDURE — 80053 COMPREHEN METABOLIC PANEL: CPT

## 2022-01-21 PROCEDURE — 74011250636 HC RX REV CODE- 250/636: Performed by: SURGERY

## 2022-01-21 PROCEDURE — 85025 COMPLETE CBC W/AUTO DIFF WBC: CPT

## 2022-01-21 PROCEDURE — 36415 COLL VENOUS BLD VENIPUNCTURE: CPT

## 2022-01-21 PROCEDURE — 74011250637 HC RX REV CODE- 250/637: Performed by: SURGERY

## 2022-01-21 PROCEDURE — 77010033678 HC OXYGEN DAILY

## 2022-01-21 PROCEDURE — 97530 THERAPEUTIC ACTIVITIES: CPT

## 2022-01-21 PROCEDURE — G0378 HOSPITAL OBSERVATION PER HR: HCPCS

## 2022-01-21 PROCEDURE — 82962 GLUCOSE BLOOD TEST: CPT

## 2022-01-21 PROCEDURE — 96376 TX/PRO/DX INJ SAME DRUG ADON: CPT

## 2022-01-21 PROCEDURE — 97161 PT EVAL LOW COMPLEX 20 MIN: CPT

## 2022-01-21 PROCEDURE — 94760 N-INVAS EAR/PLS OXIMETRY 1: CPT

## 2022-01-21 PROCEDURE — 97116 GAIT TRAINING THERAPY: CPT

## 2022-01-21 RX ADMIN — KETOROLAC TROMETHAMINE 15 MG: 30 INJECTION, SOLUTION INTRAMUSCULAR; INTRAVENOUS at 23:37

## 2022-01-21 RX ADMIN — KETOROLAC TROMETHAMINE 15 MG: 30 INJECTION, SOLUTION INTRAMUSCULAR; INTRAVENOUS at 11:31

## 2022-01-21 RX ADMIN — OXYCODONE AND ACETAMINOPHEN 2 TABLET: 5; 325 TABLET ORAL at 19:06

## 2022-01-21 RX ADMIN — KETOROLAC TROMETHAMINE 15 MG: 30 INJECTION, SOLUTION INTRAMUSCULAR; INTRAVENOUS at 17:36

## 2022-01-21 RX ADMIN — KETOROLAC TROMETHAMINE 15 MG: 30 INJECTION, SOLUTION INTRAMUSCULAR; INTRAVENOUS at 05:43

## 2022-01-21 RX ADMIN — LOSARTAN POTASSIUM 50 MG: 50 TABLET, FILM COATED ORAL at 17:37

## 2022-01-21 RX ADMIN — ONDANSETRON HYDROCHLORIDE 4 MG: 2 SOLUTION INTRAMUSCULAR; INTRAVENOUS at 01:30

## 2022-01-21 RX ADMIN — SODIUM CHLORIDE, PRESERVATIVE FREE 10 ML: 5 INJECTION INTRAVENOUS at 22:00

## 2022-01-21 RX ADMIN — LOSARTAN POTASSIUM 50 MG: 50 TABLET, FILM COATED ORAL at 09:12

## 2022-01-21 RX ADMIN — ENOXAPARIN SODIUM 40 MG: 100 INJECTION SUBCUTANEOUS at 09:13

## 2022-01-21 RX ADMIN — OXYCODONE AND ACETAMINOPHEN 1 TABLET: 5; 325 TABLET ORAL at 07:06

## 2022-01-21 RX ADMIN — OXYCODONE AND ACETAMINOPHEN 1 TABLET: 5; 325 TABLET ORAL at 01:10

## 2022-01-21 RX ADMIN — KETOROLAC TROMETHAMINE 15 MG: 30 INJECTION, SOLUTION INTRAMUSCULAR; INTRAVENOUS at 01:09

## 2022-01-21 RX ADMIN — OXYBUTYNIN CHLORIDE 10 MG: 5 TABLET, EXTENDED RELEASE ORAL at 09:12

## 2022-01-21 NOTE — PROGRESS NOTES
[facility] at [facility] Surgery    POD #1    Subjective     Doing well, pain controlled, hasnt had much food yet, agustin    Objective     Patient Vitals for the past 24 hrs:   Temp Pulse Resp BP SpO2   01/20/22 2207 98.3 °F (36.8 °C) 97 18 (!) 158/90 93 %   01/20/22 1917 97.9 °F (36.6 °C) 88 16 (!) 149/83 94 %   01/20/22 1858   16  94 %   01/20/22 1845  91 15 (!) 149/84 93 %   01/20/22 1835 98.1 °F (36.7 °C)       01/20/22 1830  92 15 (!) 143/79 94 %   01/20/22 1815  92 11 (!) 140/88 93 %   01/20/22 1800  92 13 139/85 92 %   01/20/22 1745  89 11 137/85 93 %   01/20/22 1730  90 11 136/88 93 %   01/20/22 1715  86 21 (!) 142/79 94 %   01/20/22 1700  86 10 (!) 143/93 95 %   01/20/22 1645  90 12 139/82 94 %   01/20/22 1630  87 10 (!) 147/82 94 %   01/20/22 1615  85 10 139/80 94 %   01/20/22 1600  85 9 137/75 95 %   01/20/22 1545  86 13 136/82 95 %   01/20/22 1535 98 °F (36.7 °C) 85 15 (!) 144/86 95 %   01/20/22 1530  86 17 (!) 144/86 94 %   01/20/22 1500  86 15 (!) 147/74 99 %   01/20/22 1455  88 22 111/79 94 %   01/20/22 1450  85 14 (!) 140/68 99 %   01/20/22 1446 97.7 °F (36.5 °C) 86 14 (!) 140/74 100 %   01/20/22 1442 97.7 °F (36.5 °C) 89  (!) 140/74 98 %   01/20/22 0915 98.1 °F (36.7 °C) 63 12 (!) 146/87 97 %       Date 01/20/22 0700 - 01/21/22 0659 01/21/22 0700 - 01/22/22 0659   Shift [id number] 1900-0659 24 Hour Total 1371-5436 6528-6611 24 Hour Total   INTAKE   I.V.(mL/kg/hr) 1120(1.5)  1120(0.8)        Volume (lactated Ringers infusion) 1100  1100        Volume (ceFAZolin (ANCEF) 2 g in sterile water (preservative free) 20 mL IV syringe) 20  20      Shift Total(mL/kg) 1120(18.3)  1120(18.3)      OUTPUT   Urine(mL/kg/hr) 225(0.3)  225(0.2)        Urine Output 100  100        Urine Output (mL) (Urinary Catheter 01/20/22) 125  125      Blood 50  50        Estimated Blood Loss 50  50      Shift Total(mL/kg) 275(4.5) 275(4.5)       064      Weight (kg) 61.1 61.1 61.1 61.1 61.1 61.1       PE  Pulm - CTAB  CV - RRR  Abd - soft, ND, BS present, incisions c/d/i    Labs  Lab Results   Component Value Date/Time    Sodium 138 01/21/2022 02:15 AM    Potassium 4.6 01/21/2022 02:15 AM    Chloride 107 01/21/2022 02:15 AM    CO2 27 01/21/2022 02:15 AM    Anion gap 4 (L) 01/21/2022 02:15 AM    Glucose 144 (H) 01/21/2022 02:15 AM    BUN 24 (H) 01/21/2022 02:15 AM    Creatinine 0.81 01/21/2022 02:15 AM    BUN/Creatinine ratio 30 (H) 01/21/2022 02:15 AM    GFR est AA >60 01/21/2022 02:15 AM    GFR est non-AA >60 01/21/2022 02:15 AM    Calcium 8.7 01/21/2022 02:15 AM    Bilirubin, total 0.7 01/21/2022 02:15 AM    Alk. phosphatase 75 01/21/2022 02:15 AM    Protein, total 7.1 01/21/2022 02:15 AM    Albumin 3.5 01/21/2022 02:15 AM    Globulin 3.6 01/21/2022 02:15 AM    A-G Ratio 1.0 (L) 01/21/2022 02:15 AM    ALT (SGPT) 48 01/21/2022 02:15 AM    AST (SGOT) 52 (H) 01/21/2022 02:15 AM     Lab Results   Component Value Date/Time    WBC 18.0 (H) 01/21/2022 02:15 AM    Hemoglobin (POC) 15.6 03/06/2012 11:45 AM    HGB 13.9 01/21/2022 02:15 AM    Hematocrit (POC) 46 03/06/2012 11:45 AM    HCT 43.3 01/21/2022 02:15 AM    PLATELET 120 56/25/8734 02:15 AM    MCV 97.7 01/21/2022 02:15 AM   '      Assessment     Marion Viramontes is a 82 y. o.yr old female s/p robotic bilateral inguinal hernia repairs with mesh and incisional hernia repair with mesh    Plan     DC velez today  OOB more today, PT ordered  Continue regular diet  DC IVF today  If feeling well and voiding, possible DC home later today with     Carlos Nole MD

## 2022-01-21 NOTE — PROGRESS NOTES
Problem: Mobility Impaired (Adult and Pediatric)  Goal: *Acute Goals and Plan of Care (Insert Text)  Description: FUNCTIONAL STATUS PRIOR TO ADMISSION: Patient was independent and active without use of DME. Uses a walking stick for longer distance ambulation, attends OP therapy at [address] [place] [address] Avenue: The patient lived with her  but did not require assist.    Physical Therapy Goals  Initiated 1/21/2022  1. Patient will move from supine to sit and sit to supine , scoot up and down, and roll side to side in bed with independence within 7 day(s). 2.  Patient will transfer from bed to chair and chair to bed with modified independence using the least restrictive device within 7 day(s). 3.  Patient will perform sit to stand with modified independence within 7 day(s). 4.  Patient will ambulate with modified independence for 300 feet with the least restrictive device within 7 day(s). Outcome: Not Met   PHYSICAL THERAPY EVALUATION  Patient: Rolf Hyde (62 y.o. female)  Date: 1/21/2022  Primary Diagnosis: Bilateral inguinal hernia [K40.20]  Procedure(s) (LRB):  ROBOTIC ASSISTED LEFT ABDOMINAL WALL INCISIONAL HERNIA REPAIR WITH MESH  AND ROBOTIC ASSISTED LAPRASCOPIC BILATERAL INGUINAL HERNIA REPAIRl WITH MESH (N/A) 1 Day Post-Op   Precautions:          ASSESSMENT  Based on the objective data described below, the patient presents with mildly impaired activity tolerance and mild unsteadiness with gait without support not consistent with baseline mobility level s/p admission for bilateral inguinal hernia repair. Patient educated regarding log roll technique for supine to sit to protect incisions, able to complete with SBA. Mildly shaky and cautious with standing without support but not overtly unsteady.   Provided patient with RW and able to exhibit normal gait mechanics and improved dileep with no safety concerns after brief instruction on proper usage (patient has never used). At baseline, patient lives with her  at [facility] in the independent apartments, is very active. Will benefit from skilled PT in the acute setting to improve endurance and RW use but patient is cleared for d/c home with her 's assistance as needed. Will order RW and patient prefers to resume OP PT at [facility] vs HHPT. Current Level of Function Impacting Discharge (mobility/balance): SBA transfers and gait with RW x 180'    Functional Outcome Measure: The patient scored Total: 70/100 on the Barthel Index outcome measure which is indicative of being close to independent in basic self-care. Other factors to consider for discharge: supportive and able bodied' , active PTA     Patient will benefit from skilled therapy intervention to address the above noted impairments. PLAN :  Recommendations and Planned Interventions: bed mobility training, transfer training, gait training, therapeutic exercises, neuromuscular re-education, patient and family training/education, and therapeutic activities      Frequency/Duration: Patient will be followed by physical therapy:  3 times a week to address goals. Recommendation for discharge: (in order for the patient to meet his/her long term goals)  Outpatient physical therapy follow up recommended for endurance    This discharge recommendation:  Has been made in collaboration with the attending provider and/or case management    IF patient discharges home will need the following DME: rolling walker         SUBJECTIVE:   Patient stated I'm so glad you came today.     OBJECTIVE DATA SUMMARY:   HISTORY:    Past Medical History:   Diagnosis Date    Arthritis     OSTEO, SPINE    Cancer ([facility] 2001    BASAL CELL NOSE    Chronic pain     BACK    Diabetes ([facility] [address]     BORDERLINE, NIDDM    GERD (gastroesophageal reflux disease)     HIATAL HERNIA    Hypertension 2019    Liver disease 1960    HEP A    Thromboembolus ([facility] [address] June 2020    Resolved. Do not take medication [place]) any longer.      Past Surgical History:   Procedure Laterality Date    HX CHOLECYSTECTOMY  2008    HX COLONOSCOPY  08/17/2021    HX MOHS PROCEDURES  2001    BASAL CELL NOSE    HX ORTHOPAEDIC  2011 &2012    Lamiectomy and 2  lumber fusions    HX RALPH AND BSO  1995    HX TONSILLECTOMY      CHILD    MI BREAST SURGERY PROCEDURE UNLISTED  1999 and 2019    Biopsies    MI REPAIR ENTEROCELE, W Kae Clark  2014       Personal factors and/or comorbidities impacting plan of care:     Home Situation  Home Environment: Apartment  # Steps to Enter: 0  One/Two Story Residence:  ([address] [place] [address] but elevator access)  Living Alone: No  Support Systems: Spouse/Significant Other,Friend/Neighbor  Patient Expects to be Discharged to[de-identified] Home with family assistance  Current DME Used/Available at Home: Cane, straight,Grab bars,Shower chair  Tub or Shower Type: Shower    EXAMINATION/PRESENTATION/DECISION MAKING:   Critical Behavior:  Neurologic State: Alert  Orientation Level: Oriented X4  Cognition: Follows commands       Range Of Motion:  AROM: Generally decreased, functional                       Strength:    Strength: Generally decreased, functional                    Tone & Sensation:   Tone: Normal              Sensation: Intact               Coordination:  Coordination: Within functional limits  Vision:      Functional Mobility:  Bed Mobility:  Rolling: Supervision  Supine to Sit: Stand-by assistance        Transfers:  Sit to Stand: Stand-by assistance  Stand to Sit: Stand-by assistance        Bed to Chair: Stand-by assistance              Balance:   Sitting: Intact  Standing: Impaired  Standing - Static: Good;Constant support  Standing - Dynamic : Constant support;Good  Ambulation/Gait Training:  Distance (ft): 180 Feet (ft)  Assistive Device: Gait belt;Walker, rolling  Ambulation - Level of Assistance: Stand-by assistance        Gait Abnormalities: Decreased step clearance        Base of Support: Widened Speed/[place]: Pace decreased (<100 feet/min)  Step Length: Right shortened;Left shortened                Functional Measure:  Barthel Index:    Bathin  Bladder: 10  Bowels: 10  Groomin  Dressin  Feeding: 10  Mobility: 10  Stairs: 5  Toilet Use: 5  Transfer (Bed to Chair and Back): 10  Total: 70/100       The Barthel ADL Index: Guidelines  1. The index should be used as a record of what a patient does, not as a record of what a patient could do. 2. The main aim is to establish degree of independence from any help, physical or verbal, however minor and for whatever reason. 3. The need for supervision renders the patient not independent. 4. A patient's performance should be established using the best available evidence. Asking the patient, friends/relatives and nurses are the usual sources, but direct observation and common sense are also important. However direct testing is not needed. 5. Usually the patient's performance over the preceding 24-48 hours is important, but occasionally longer periods will be relevant. 6. Middle categories imply that the patient supplies over 50 per cent of the effort. 7. Use of aids to be independent is allowed. Score Interpretation (from [address] [address])    Independent   60-79 Minimally independent   40-59 Partially dependent   20-39 Very dependent   <20 Totally dependent     -Bob Cohen., Barthel, D.W. (1965). Functional evaluation: the Barthel Index. [address] [facility] [employer] ([address] (1997). The Barthel activities of daily living index: self-reporting versus actual performance in the old (> or = 75 years). Journal of [address] [place] [address] 45(7), [facility], RobertPATRICE, Corby Bryant., Tracey Canales. (1999). Measuring the change in disability after inpatient rehabilitation; comparison of the responsiveness of the Barthel Index and Functional [place] Measure.  Journal of Neurology, Neurosurgery, and Psychiatry, 66(0), 562-360. FILOMENA Bowden, OLIVIA Valadez, & Marie Espitia M.A. (2004) Assessment of post-stroke quality of life in cost-effectiveness studies: The usefulness of the Barthel Index and the EuroQoL-5D. Quality of Life Research, 15, 346-67        Physical Therapy Evaluation Charge Determination   History Examination Presentation Decision-Making   MEDIUM  Complexity : 1-2 comorbidities / personal factors will impact the outcome/ POC  LOW Complexity : 1-2 Standardized tests and measures addressing body structure, function, activity limitation and / or participation in recreation  LOW Complexity : Stable, uncomplicated  Other outcome measures Barthel 70  LOW       Based on the above components, the patient evaluation is determined to be of the following complexity level: LOW     Pain Ratin/10 in groin, incisional    Activity Tolerance:   Fair    After treatment patient left in no apparent distress:   Sitting in chair and Call bell within reach    COMMUNICATION/EDUCATION:   The patients plan of care was discussed with: Registered nurse and Case management. Fall prevention education was provided and the patient/caregiver indicated understanding. and Patient/family agree to work toward stated goals and plan of care.     Thank you for this referral.  Carol Fisher, PT   Time Calculation: 32 mins

## 2022-01-21 NOTE — PROGRESS NOTES
Received Pt awake,alert and oriented x 3. Pt denies any pain or discomfort at this time,still groggy from surgery,endorsed from day nurse Marah PISANO that Pt has crepitus in her face and both upper arms. pt mointered very closely throughout the night,assesses Q 1/Hr,8pm 9pm,10pm 11pm 12am,1am,2am,3am,4am,5am 6am,7am 8am,Pts crepitus continued to diminish throughout the night,Pt was not able to open her Rt eye and now she is able to,Pts Urine output was 550 last night. Pt was medicated for pain last time at 706am,restinf comfortably at this time. report endorsed back to [address] were sent.

## 2022-01-21 NOTE — OP NOTES
[address]   OPERATIVE REPORT    Name:  Eveline Wright  MR#:  [id number]  :  1935  ACCOUNT #:  [de-identified]  DATE OF SERVICE:  2022      PREOPERATIVE DIAGNOSES:  Right inguinal hernia and incisional hernia. POSTOPERATIVE DIAGNOSES:  Bilateral inguinal hernias and left side abdominal wall incisional hernia. PROCEDURES PERFORMED:  Robotic-assisted left abdominal incisional hernia repair with mesh and robotic bilateral inguinal hernia repairs with mesh. SURGEON:  Emily Barcenas MD    ASSISTANT:  Ian Jacobo SA    ANESTHESIA:  General.    COMPLICATIONS:  None. SPECIMENS REMOVED:  None. IMPLANTS:  Right-sided 14 x 10-cm Parietex ProGrip mesh, 14 x 10-cm left-sided Parietex ProGrip mesh, and an 11-cm round Ventralight Echo mesh placed for the incisional hernia repair. ESTIMATED BLOOD LOSS:  Less than 50 mL. DRAINS:  None. FINDINGS:  Medium-size right-sided inguinal hernia defect and a small left-sided direct inguinal hernia defect, both repaired with a 14 x 10-cm Parietex ProGrip mesh placed in the preperitoneal plane, 2.5 x 2-cm incisional hernia defect in the left lower abdominal wall near the inguinal region repaired with primary repair in an IPOM style repair with an 11-cm round Bard Ventralight Echo ST mesh. INDICATIONS FOR THE OPERATION:  The patient is an 62-year-old female who has a history of significant right-sided inguinal hernia and also has a hernia of the abdominal wall in the left side of the abdominal wall from previous anterior spine surgery and she is needing repair with mesh in the operating room of both hernias. DESCRIPTION OF THE OPERATION:  The patient was met in the preop holding area. The H and P was updated. Consent was signed. All risks and benefits were explained to the patient prior to the start of the operation. She was taken back to the operating room. She was lying in the supine position.   The abdomen was prepped and draped in standard sterile fashion. Time-out was called. Antibiotics were given. SCDs were on lower extremities. Started the operation by making an 8-mm incision into the right upper quadrant. We then inserted a da Breanne VisiPort trocar into the intra-abdominal cavity, insufflating to 15 mmHg. There were no injuries to the underlying abdominal structures. We then placed another 8-mm trocar superior to the umbilicus, another one in the left upper quadrant. There were no adhesions in that area. We looked down into the lower abdomen and we could see there was an incisional hernia with omental fat going up into the hernia defect in the left lower abdomen. We could see a right-sided direct inguinal hernia that is medium size and then we saw a left-sided inguinal hernia that was small but was present there. We called the  to consent her for repair of left-sided inguinal hernia as well, so we would do a bilateral inguinal hernia repair. We started off the operation after we had docked the [employer] robot onto the patient and placed her into Trendelenburg positioning. We took down the omental fat going into the incisional hernia defect and dropped that back down to the abdominal cavity. After we had done that, we started our repair on the right-sided inguinal hernia, dissecting into the preperitoneal plane, going from medial to lateral in the right lower quadrant, dissecting all the way down inferiorly all the way down to the pubic bone into the lateral abdominal wall. We reduced the hernia sac, dissecting that free from the inguinal canal, dissecting that free reducing that back into the peritoneal cavity. We widened our dissection plane down well below the pubic bone and along the lateral abdominal wall to accommodate our mesh. We then brought a 14 x 10-cm right-sided Parietex ProGrip mesh into the preperitoneal plane, centering it over hernia defect.   We trimmed off 1 cm off of the end of the mesh to make sure it would fit the preperitoneal plane we had created. We centered it over hernia defect which was small to medium size and made sure there was plenty of medial overlap due to the more medial nature of the direct inguinal hernia. After we had dissected all that out, we had our Parietex ProGrip mesh centered over the defect and well below the pubic bone. We sutured it to the pubic bone inferiorly into the anterior abdominal wall anteriorly and made sure all the mesh  were adherent. We then closed our preperitoneal flap with a running 3-0 absorbable V-Loc suture in a running fashion. We removed our sutures at the end of the repair. We would now start our repair on the patient's left side, dissecting with the scissors and cautery through the abdominal wall, dissecting into the preperitoneal plane, dissecting inferior into that preperitoneal plane all the way down to the hernia sac and into the lateral abdominal wall, continuing that dissection plane. This side was little bit more adherent to the anterior abdominal wall and also adherent to the inferior epigastric vessels which we had taken great care into dissecting the peritoneum off of that. Once we were able to drop that down, we widened our dissection plane down below the pubic bone along the lateral abdominal wall. Our hernia defect was well exposed and then we brought a 14 x 10-cm Parietex ProGrip mesh which we had taken 1 cm off the end of it. We made sure it was overlapping our medial dissection very well in the lower midline and then made sure the mesh  were adherent all the way around to the anterior abdominal wall. The hernia defect was centered over the mesh. We then sutured the mesh to the  pubic bone inferiorly and then we closed our preperitoneal flap, closing it with a running 3-0 absorbable V-Loc suture in a running fashion and then also closed a small hole in the peritoneum with 3-0 Vicryl suture.   We removed all of our sutures at the end of the case and then we would now repair our incisional hernia in the left lower abdominal wall. We did not need to undock the robot. We kept the robot in the same position, we had adequate exposure of it and we measured our hernia defect, it measured 2.5 x 2 x 2.0 cm. We closed it with a running 0 absorbable V-Loc suture in a running fashion. The hernia defect was closed and then we would use an IPOM style repair. We were so close to our previous inguinal hernia repairs, making a flap would be very difficult and also her peritoneum especially from her previous repair was extremely thin and friable and would not allow for a preperitoneal repair, so we would do an IPOM, intraperitoneal onlay mesh, repair. With the defect now closed, we then brought an 11-cm round Ventralight ST mesh with Echo positioning device into the abdominal cavity. We centered the mesh and pulled it up through the abdominal wall, centering it over our hernia defect, getting plenty of the mesh adequate overlap. We then insufflated the Echo 2 positioning device. The mesh was pulled taut against the abdominal wall. We then sutured the mesh to the anterior abdominal wall circumferentially around 360 degrees with a 0 nonabsorbable V-Loc suture in a running fashion all the way around the mesh using 2 sutures. The mesh was adherent and completely sutured to the anterior abdominal wall. We then inspected the abdominal cavity. We removed all of our sutures at the end of the procedure. All the sutures were removed and the Echo positioning advice was also taken off desufflated and removed from the abdominal cavity, leaving the mesh only up to the anterior abdominal wall.   The remainder of the abdominal structures appeared to be normal.  We then undocked the [employer] robot and then used a 0 Vicryl suture to make sure the mesh would be adherent to the middle of the fascial defect with an Endo Close suture passing device, 0 Vicryl was placed and then we also closed our 12-mm port site in the right upper quadrant which we had switched out from an 8-mm port to a 12-mm port during the incisional hernia repair portion of the operation. We then closed that 12-mm defect with a 0 Vicryl suture in an Endo Close suture passing device in an interrupted figure-of-eight fashion. We then desufflated the abdominal cavity, removed the trocars, and closed the skin with 4-0 Monocryl and Dermabond to complete the operation. Dr. Jenny Singh was present and scrubbed during the entire operation. The counts were correct.         Atiya Blanco MD      [id number]  D:  01/20/2022 15:41  T:  01/21/2022 2:40  JOB #:  [id number]

## 2022-01-21 NOTE — PROGRESS NOTES
RUR:  7% Low     HEMA:  [facility]. Outpatient PT at [facility] to resume upon return.  to provide transport home once medically stable. Follow-up with PCP/specialist.     Primary Contact: , Bartolome Matias, [phone]    CM gave patient State Observation letter. Medicare Outpatient Observation Notice (MOON) provided to patient/representative with verbal explanation of the notice. Time allotted for questions regarding the notice. Patient /representative provided a completed copy of the MOON notice. Copy placed on bedside chart. POD#1 for a robotic bilateral inguinal hernia repairs with mesh and incisional hernia repair with mesh. Care Management Interventions  PCP Verified by CM:  Yes (Dr. Zita Esparza, last visit 8/1/21)  Mode of Transport at Discharge: BLS  Discharge Durable Medical Equipment: No  Physical Therapy Consult: Yes  Occupational Therapy Consult: No  Speech Therapy Consult: No  Support Systems: Spouse/Significant Other,Friend/Neighbor  Confirm Follow Up Transport: Family  The Plan for Transition of Care is Related to the Following Treatment Goals : Return to [place] [address]  Discharge Location  Patient Expects to be Discharged to[de-identified] Home with family assistance    Reason for Admission:  Bilateral inguinal hernia     RUR Score:   7% Low                   Plan for utilizing home health:  PT eval. pending     PCP: First and Last name:  Toni Sorensen MD     Name of Practice:    Are you a current patient: Yes/No: Yes   Approximate date of last visit: 8/1/21   Can you participate in a virtual visit with your PCP: Yes                    Current Advanced Directive/Advance Care Plan: Prior    Healthcare Decision Maker:   Click here to complete [address] including selection of the Healthcare Decision Maker Relationship (ie \"Primary\")             Primary Decision MakerArmjaspal Vera Spouse - [phone]                  Transition of Care Plan: [address] [place] [address] met with patient and  at bedside to introduce self and explain role. Patient lives with her  at [facility] in an [place] apartment. Patient is independent with ADL's and ambulates with the use of a cane. Patient and  share IADL responsibilities. [facility] provides weekly cleaning. Patient's  will provide transport home once medically stable. CM verified patient's demographics, insurance and PCP. Preferred pharmacy is [facility] on [address] in [address] [facility] with no barriers obtaining needed prescriptions. 1:15PM - PT notified CM of outpatient PT and rolling walker recommendation. Rolling walker order sent to [facility] via [employer]; awaiting response. CM spoke with Louias Jansen, [facility] Liaison (p: [phone]) about outpatient PT recommendation. Per Annette Gamez, no orders needed as patient was receiving outpatient PT prior to hospitalization. Patient will resume upon return. 2:20PM - Per patient and , they would like to cancel RW order and save Medicare benefits for any DME needs in the future. Per , he will borrow a RW from [facility]. Referral cancelled per request.    CM to continue to follow as needed.     Carolyn Pierce, JONO   [phone]

## 2022-01-21 NOTE — PROGRESS NOTES
1920: Patient arrived to unit. This RN assessed patient resting quietly with pain 5/10 in abdomen. This RN administered dilaudid 0.5 mg IV. Incisions are clean, dry, and intact. Crepitus and swelling noted on the right side of patient's face (swelling right eye shut) and bilateral upper extremities. Lung sounds are clear. Patient placed on 2L NC with oxygen at 94%. Visit Vitals  BP (!) 149/83   Pulse 88   Temp 97.9 °F (36.6 °C)   Resp 16   Ht 4' 10\" (1.473 m)   Wt 61.1 kg (134 lb 11.2 oz)   SpO2 94%   BMI 28.15 kg/m²       Bedside and Verbal shift change report given to [address] (oncoming nurse) by Lola Johnson RN (offgoing nurse). Report included the following information SBAR, Kardex, Intake/Output, MAR and Recent Results.

## 2022-01-22 VITALS
DIASTOLIC BLOOD PRESSURE: 83 MMHG | HEIGHT: 58 IN | HEART RATE: 81 BPM | BODY MASS INDEX: 28.28 KG/M2 | WEIGHT: 134.7 LBS | OXYGEN SATURATION: 94 % | SYSTOLIC BLOOD PRESSURE: 152 MMHG | RESPIRATION RATE: 16 BRPM | TEMPERATURE: 97.4 F

## 2022-01-22 LAB
GLUCOSE BLD STRIP.AUTO-MCNC: 102 MG/DL (ref 65–117)
GLUCOSE BLD STRIP.AUTO-MCNC: 103 MG/DL (ref 65–117)
SERVICE CMNT-IMP: NORMAL
SERVICE CMNT-IMP: NORMAL

## 2022-01-22 PROCEDURE — 74011250636 HC RX REV CODE- 250/636: Performed by: SURGERY

## 2022-01-22 PROCEDURE — 82962 GLUCOSE BLOOD TEST: CPT

## 2022-01-22 PROCEDURE — 74011250637 HC RX REV CODE- 250/637: Performed by: SURGERY

## 2022-01-22 PROCEDURE — G0378 HOSPITAL OBSERVATION PER HR: HCPCS

## 2022-01-22 PROCEDURE — 96372 THER/PROPH/DIAG INJ SC/IM: CPT

## 2022-01-22 RX ADMIN — OXYBUTYNIN CHLORIDE 10 MG: 5 TABLET, EXTENDED RELEASE ORAL at 08:37

## 2022-01-22 RX ADMIN — LOSARTAN POTASSIUM 50 MG: 50 TABLET, FILM COATED ORAL at 08:36

## 2022-01-22 RX ADMIN — OXYCODONE AND ACETAMINOPHEN 2 TABLET: 5; 325 TABLET ORAL at 07:20

## 2022-01-22 RX ADMIN — OXYCODONE AND ACETAMINOPHEN 1 TABLET: 5; 325 TABLET ORAL at 11:41

## 2022-01-22 RX ADMIN — ENOXAPARIN SODIUM 40 MG: 100 INJECTION SUBCUTANEOUS at 08:38

## 2022-01-22 NOTE — PROGRESS NOTES
Progress Note    Patient: Claudene Cumins MRN: [id number]  SSN: [id number]    YOB: 1935  Age: 82 y.o. Sex: female      Admit Date: 2022    2 Days Post-Op    Procedure:  Procedure(s):  ROBOTIC ASSISTED LEFT ABDOMINAL WALL INCISIONAL HERNIA REPAIR WITH MESH  AND ROBOTIC ASSISTED LAPRASCOPIC BILATERAL INGUINAL HERNIA REPAIRl WITH MESH    Subjective:     No acute surgical issues. Pt is doing well. She reported significant pain at 7 am which improved with percocet. No nausea or vomiting. Objective:     Visit Vitals  BP (!) 152/83   Pulse 81   Temp 97.4 °F (36.3 °C)   Resp 16   Ht 4' 10\" (1.473 m)   Wt 134 lb 11.2 oz (61.1 kg)   SpO2 94%   BMI 28.15 kg/m²       Temp (24hrs), Av.9 °F (36.6 °C), Min:97.4 °F (36.3 °C), Max:98.3 °F (36.8 °C)        Physical Exam:    Gen:  NAD  Pulm:  Unlabored  Abd:  S/ND/appropriate TTP  Wound:  C/D/I    Recent Results (from the past 24 hour(s))   GLUCOSE, POC    Collection Time: 22 11:27 AM   Result Value Ref Range    Glucose (POC) 111 65 - 117 mg/dL    Performed by Doris Gilford    GLUCOSE, POC    Collection Time: 22  4:31 PM   Result Value Ref Range    Glucose (POC) 96 65 - 117 mg/dL    Performed by Doris Gilford    GLUCOSE, POC    Collection Time: 22  9:14 PM   Result Value Ref Range    Glucose (POC) 110 65 - 117 mg/dL    Performed by [id number] [facility], POC    Collection Time: 22  6:58 AM   Result Value Ref Range    Glucose (POC) 102 65 - 117 mg/dL    Performed by [facility]:     Hospital Problems  Date Reviewed: 2022          Codes Class Noted POA    Bilateral inguinal hernia ICD-10-CM: K40.20  ICD-9-CM: 550.92  2022 Unknown              Plan/Recommendations/Medical Decision Making:     - Diet as tolerated  - Pain control  - Out of bed.   Encourage ambulation  - Plan DC home this am

## 2022-01-22 NOTE — ROUTINE PROCESS
Bedside and Verbal shift change report given to [address] [place] [address],[address] (oncoming nurse) by Samuel Rust RN (offgoing nurse). Report included the following information SBAR, Kardex, Intake/Output, MAR and Recent Results.

## 2022-01-22 NOTE — PROGRESS NOTES
Patient pulled out IV line on accident. Patient is refusing at this time for this RN to start another IV due to possible discharge today.

## 2022-01-22 NOTE — ROUTINE PROCESS
Bedside and Verbal shift change report given to Fredrick Johnson RN (oncoming nurse) by ALIYA Brown (offgoing nurse). Report included the following information SBAR, Kardex, Intake/Output, MAR and Recent Results.

## 2022-01-23 NOTE — PROGRESS NOTES
Discharge instructions reviewed with patient and . Understanding good. Pain managed with percocet. Hector Gutierrez Up and about. . incision line/lap sites well approximated

## 2022-02-07 ENCOUNTER — OFFICE VISIT (OUTPATIENT)
Dept: SURGERY | Age: 87
End: 2022-02-07
Payer: MEDICARE

## 2022-02-07 VITALS
SYSTOLIC BLOOD PRESSURE: 136 MMHG | RESPIRATION RATE: 18 BRPM | OXYGEN SATURATION: 97 % | HEART RATE: 90 BPM | DIASTOLIC BLOOD PRESSURE: 80 MMHG | BODY MASS INDEX: 28.13 KG/M2 | WEIGHT: 134 LBS | TEMPERATURE: 97.8 F | HEIGHT: 58 IN

## 2022-02-07 DIAGNOSIS — Z09 FOLLOW-UP EXAMINATION AFTER ABDOMINAL SURGERY: Primary | ICD-10-CM

## 2022-02-07 PROCEDURE — 99024 POSTOP FOLLOW-UP VISIT: CPT

## 2022-02-07 NOTE — PROGRESS NOTES
Subjective:      Jacqui Eduardo is a 82 y.o. female presents for postop care 18  days following ROBOTIC ASSISTED LEFT ABDOMINAL WALL INCISIONAL HERNIA REPAIR WITH MESH  AND ROBOTIC ASSISTED LAPRASCOPIC BILATERAL INGUINAL HERNIA REPAIRl WITH MESH. Pt is doing well. She denies fever, chest pin, or shortness of breath. Appetite is good. Eating a regular diet without difficulty. She has been taking 1/2 dose of Miralax and 1 TBS benefiber daily and reports bowel movements are regular. The patient is voiding without difficulty. The patient is not having any pain. She is able to ambulate with cane at home as she was doing preoperatively. Ms. Elizabeth Agrawal has a reminder for a \"due or due soon\" health maintenance. I have asked that she contact her primary care provider for follow-up on this health maintenance. Objective:     Visit Vitals  Ht 4' 10\" (1.473 m)   BMI 28.15 kg/m²       General: alert, cooperative, no distress, appears stated age   Cardiac:  Regular rate and rhythm    Pulmonary:     Lungs clear to auscultation    Abdomen:    soft, bowel sounds active, non-tender       Incision:   healing well, no drainage, no erythema, no swelling, well approximated, no dehiscence, incision well approximated     Assessment:     Doing well postoperatively. Plan:     1. Continue any current medications and bowel regimen. 2. Wound care discussed- okay to get incisions wet. Patient told she may use moisturizer on incision sites. 3. No lifting >20 lbs x 1 week. Pt is to increase activities as tolerated. .  4. RTC PRN    We discussed the importance of proper diet post op    All questions were personally answered. Thank you for allowing us to participate in the care of your patient.     > 15 minutes were spent with patient with greater than 50% of that time spent face to face counseling    Milan Vargas NP  [facility]   2/7/2022

## 2022-02-07 NOTE — LETTER
2/7/2022    Patient: Gurdeep Arredondo   YOB: 1935   Date of Visit: 2/7/2022     Alfonso Barrera, [address] [facility] [address]  Via Fax: [phone]    Dear Alfonso Barrera MD,      Thank you for referring Ms. Reid Wolfe to [facility] for evaluation. My notes for this consultation are attached. If you have questions, please do not hesitate to call me. I look forward to following your patient along with you.       Sincerely,    Corey Whitney NP

## 2022-02-07 NOTE — PROGRESS NOTES
1. Have you been to the ER, urgent care clinic since your last visit? Hospitalized since your last visit? no    2. Have you seen or consulted any other health care providers outside of the [address] [place] [address] since your last visit? Include any pap smears or colon screening.  no

## 2022-02-07 NOTE — PATIENT INSTRUCTIONS
Recommendations after abdominal surgery:    -  No lifting greater than 20 lbs x1 week then 30 lbs x1 week. Advance activity as tolerated. -  Avoid abdominal exercises for another 2 weeks     -  Ok to bath as normal and you may get incisions wet. Glue will fall off on its own. May moisturize incision sites.     -  May use supportive wear     -  May use heating pad for any additional Abdominal soreness    -  Follow up only as needed

## 2022-03-18 PROBLEM — R07.9 CHEST PAIN: Status: ACTIVE | Noted: 2017-11-08

## 2022-03-19 PROBLEM — K40.20 BILATERAL INGUINAL HERNIA: Status: ACTIVE | Noted: 2022-01-20

## 2023-05-11 RX ORDER — ACETAMINOPHEN 500 MG
500 TABLET ORAL PRN
COMMUNITY

## 2023-05-11 RX ORDER — IBUPROFEN 800 MG/1
TABLET ORAL EVERY 6 HOURS PRN
COMMUNITY
Start: 2022-01-20

## 2023-05-11 RX ORDER — OXYBUTYNIN CHLORIDE 5 MG/1
TABLET, EXTENDED RELEASE ORAL DAILY
COMMUNITY
Start: 2011-12-22

## 2023-05-11 RX ORDER — ALENDRONATE SODIUM 70 MG/1
TABLET ORAL
COMMUNITY

## 2023-05-11 RX ORDER — ASPIRIN 81 MG/1
TABLET ORAL
COMMUNITY

## 2023-05-11 RX ORDER — LOSARTAN POTASSIUM 50 MG/1
TABLET ORAL
COMMUNITY

## 2024-06-17 ENCOUNTER — HOSPITAL ENCOUNTER (OUTPATIENT)
Age: 89
Discharge: HOME | End: 2024-06-20
Payer: MEDICARE

## 2024-06-17 ENCOUNTER — TRANSCRIBE ORDERS (OUTPATIENT)
Facility: HOSPITAL | Age: 89
End: 2024-06-17

## 2024-06-17 DIAGNOSIS — R22.42 LOCALIZED SWELLING, MASS, OR LUMP OF LOWER EXTREMITY, LEFT: ICD-10-CM

## 2024-06-17 DIAGNOSIS — R22.42 LOCALIZED SWELLING, MASS, OR LUMP OF LOWER EXTREMITY, LEFT: Primary | ICD-10-CM

## 2024-06-17 DIAGNOSIS — Z86.718 PERSONAL HISTORY OF VENOUS THROMBOSIS AND EMBOLISM: ICD-10-CM

## 2024-06-17 DIAGNOSIS — M79.662 PAIN OF LEFT LOWER LEG: ICD-10-CM

## 2024-06-17 PROCEDURE — 93970 EXTREMITY STUDY: CPT

## 2025-07-07 ENCOUNTER — TRANSCRIBE ORDERS (OUTPATIENT)
Facility: HOSPITAL | Age: 89
End: 2025-07-07

## 2025-07-07 DIAGNOSIS — J98.4 DISEASE OF LUNG: Primary | ICD-10-CM

## 2025-07-07 DIAGNOSIS — R07.89 OTHER CHEST PAIN: ICD-10-CM

## 2025-07-10 ENCOUNTER — TRANSCRIBE ORDERS (OUTPATIENT)
Facility: HOSPITAL | Age: 89
End: 2025-07-10

## 2025-07-10 DIAGNOSIS — R13.12 DYSPHAGIA, OROPHARYNGEAL PHASE: ICD-10-CM

## 2025-07-10 DIAGNOSIS — G30.8 OTHER ALZHEIMER'S DISEASE (HCC): ICD-10-CM

## 2025-07-10 DIAGNOSIS — F02.80 OTHER ALZHEIMER'S DISEASE (HCC): ICD-10-CM

## 2025-07-10 DIAGNOSIS — T17.900A: Primary | ICD-10-CM

## 2025-07-14 ENCOUNTER — HOSPITAL ENCOUNTER (OUTPATIENT)
Age: 89
Discharge: HOME | End: 2025-07-17
Payer: MEDICARE

## 2025-07-14 DIAGNOSIS — R13.12 DYSPHAGIA, OROPHARYNGEAL: ICD-10-CM

## 2025-07-14 PROCEDURE — 71046 X-RAY EXAM CHEST 2 VIEWS: CPT

## 2025-07-18 ENCOUNTER — HOSPITAL ENCOUNTER (EMERGENCY)
Facility: HOSPITAL | Age: 89
Discharge: HOME | End: 2025-07-18
Attending: EMERGENCY MEDICINE
Payer: MEDICARE

## 2025-07-18 ENCOUNTER — APPOINTMENT (OUTPATIENT)
Facility: HOSPITAL | Age: 89
End: 2025-07-18
Payer: MEDICARE

## 2025-07-18 VITALS
WEIGHT: 126.32 LBS | HEIGHT: 60 IN | HEART RATE: 76 BPM | OXYGEN SATURATION: 99 % | DIASTOLIC BLOOD PRESSURE: 89 MMHG | TEMPERATURE: 97.9 F | RESPIRATION RATE: 17 BRPM | BODY MASS INDEX: 24.8 KG/M2 | SYSTOLIC BLOOD PRESSURE: 154 MMHG

## 2025-07-18 DIAGNOSIS — M19.011 ARTHRITIS OF RIGHT SHOULDER: Primary | ICD-10-CM

## 2025-07-18 PROCEDURE — 73030 X-RAY EXAM OF SHOULDER: CPT

## 2025-07-18 PROCEDURE — 99284 EMERGENCY DEPT VISIT MOD MDM: CPT

## 2025-07-18 PROCEDURE — 6360000002 HC RX W HCPCS: Performed by: EMERGENCY MEDICINE

## 2025-07-18 PROCEDURE — 96372 THER/PROPH/DIAG INJ SC/IM: CPT

## 2025-07-18 RX ORDER — MEMANTINE HYDROCHLORIDE 21 MG/1
CAPSULE, EXTENDED RELEASE ORAL
COMMUNITY

## 2025-07-18 RX ORDER — DONEPEZIL HYDROCHLORIDE 10 MG/1
TABLET, FILM COATED ORAL
COMMUNITY

## 2025-07-18 RX ORDER — KETOROLAC TROMETHAMINE 30 MG/ML
30 INJECTION, SOLUTION INTRAMUSCULAR; INTRAVENOUS
Status: COMPLETED | OUTPATIENT
Start: 2025-07-18 | End: 2025-07-18

## 2025-07-18 RX ADMIN — KETOROLAC TROMETHAMINE 30 MG: 30 INJECTION, SOLUTION INTRAMUSCULAR; INTRAVENOUS at 18:24

## 2025-07-18 ASSESSMENT — ENCOUNTER SYMPTOMS
VOMITING: 0
SORE THROAT: 0
COUGH: 0

## 2025-07-18 ASSESSMENT — PAIN DESCRIPTION - LOCATION
LOCATION: ARM
LOCATION: ARM

## 2025-07-18 ASSESSMENT — PAIN SCALES - GENERAL
PAINLEVEL_OUTOF10: 2
PAINLEVEL_OUTOF10: 5

## 2025-07-18 ASSESSMENT — PAIN - FUNCTIONAL ASSESSMENT
PAIN_FUNCTIONAL_ASSESSMENT: PREVENTS OR INTERFERES SOME ACTIVE ACTIVITIES AND ADLS
PAIN_FUNCTIONAL_ASSESSMENT: PREVENTS OR INTERFERES WITH MANY ACTIVE NOT PASSIVE ACTIVITIES
PAIN_FUNCTIONAL_ASSESSMENT: 0-10

## 2025-07-18 ASSESSMENT — PAIN DESCRIPTION - ORIENTATION
ORIENTATION: RIGHT
ORIENTATION: RIGHT

## 2025-07-18 ASSESSMENT — LIFESTYLE VARIABLES
HOW OFTEN DO YOU HAVE A DRINK CONTAINING ALCOHOL: 4 OR MORE TIMES A WEEK
HOW MANY STANDARD DRINKS CONTAINING ALCOHOL DO YOU HAVE ON A TYPICAL DAY: 1 OR 2

## 2025-07-18 ASSESSMENT — PAIN DESCRIPTION - DESCRIPTORS
DESCRIPTORS: SHARP;SHOOTING
DESCRIPTORS: SHARP;SHOOTING

## 2025-07-18 ASSESSMENT — PAIN DESCRIPTION - PAIN TYPE
TYPE: CHRONIC PAIN
TYPE: CHRONIC PAIN

## 2025-07-18 NOTE — ED NOTES
Patient discharged home Pt respirations regular and unlabored, cap refill less than two seconds. Skin pink, dry and warm. No further complaints at this time. Patient verbalized understanding of discharge paperwork and has no further questions at this time.    Education provided about continuation of care, follow up care and medication administration. Patient/guardian able to provided teach back about discharge instructions.

## 2025-07-18 NOTE — ED PROVIDER NOTES
[facility]  EMERGENCY DEPARTMENT ENCOUNTER      Pt Name: Rashmi Paul  MRN: [id number]  Birthdate 1935  Date of evaluation: 7/18/2025  Provider: Akhil Kelly MD    CHIEF COMPLAINT       Chief Complaint   Patient presents with    Arm Pain         HISTORY OF PRESENT ILLNESS   (Location/Symptom, Timing/Onset, Context/Setting, Quality, Duration, Modifying Factors, Severity)  Note limiting factors.   89-year-old female presents from home accompanied by spouse with concern for right biceps pain.   states she has a history of chronic pain to the right shoulder and biceps.  Usually takes 2 Tylenol in the morning and it gets better.  This did not work today.  Denies any recent falls or injuries.  Patient points to her right anterior shoulder and biceps tendon as the location of her pain.  Pain worsened with movement.    The history is provided by the patient, the spouse and medical records.         Review of External Medical Records:     Nursing Notes were reviewed.    REVIEW OF SYSTEMS    (2-9 systems for level 4, 10 or more for level 5)     Review of Systems   Constitutional:  Negative for fatigue.   HENT:  Negative for sore throat.    Eyes:  Negative for visual disturbance.   Respiratory:  Negative for cough.    Cardiovascular:  Negative for palpitations.   Gastrointestinal:  Negative for vomiting.   Genitourinary:  Negative for difficulty urinating.   Musculoskeletal:  Negative for myalgias.   Skin:  Negative for rash.   Neurological:  Negative for weakness.       Except as noted above the remainder of the review of systems was reviewed and negative.       PAST MEDICAL HISTORY     Past Medical History:   Diagnosis Date    Arthritis     OSTEO, SPINE    Cancer (HCC) 2001    BASAL CELL NOSE    Chronic pain     BACK    Diabetes (HCC)     BORDERLINE, NIDDM    GERD (gastroesophageal reflux disease)     HIATAL HERNIA    Hypertension 2019    Liver disease 1960    HEP A    Thromboembolus

## 2025-07-18 NOTE — ED TRIAGE NOTES
Pt has chronic right bicep pain and pain has worsened throughout the day. Pt took acetaminophen at 11am and tramadol at 12pm without relief.

## 2025-07-21 ENCOUNTER — HOSPITAL ENCOUNTER (OUTPATIENT)
Facility: HOSPITAL | Age: 89
Discharge: HOME | End: 2025-07-24
Attending: INTERNAL MEDICINE
Payer: MEDICARE

## 2025-07-21 DIAGNOSIS — F02.80 OTHER ALZHEIMER'S DISEASE (HCC): ICD-10-CM

## 2025-07-21 DIAGNOSIS — G30.8 OTHER ALZHEIMER'S DISEASE (HCC): ICD-10-CM

## 2025-07-21 DIAGNOSIS — R13.12 DYSPHAGIA, OROPHARYNGEAL PHASE: ICD-10-CM

## 2025-07-21 PROCEDURE — 74230 X-RAY XM SWLNG FUNCJ C+: CPT

## 2025-07-21 PROCEDURE — 92611 MOTION FLUOROSCOPY/SWALLOW: CPT

## 2025-07-21 NOTE — PROGRESS NOTES
[facility]  SPEECH PATHOLOGY MODIFIED BARIUM SWALLOW STUDY  Patient: Rashmi Paul (89 y.o. female)  Date: 7/21/2025  Referring Provider: Dr. Joesph Adamson MD    SUBJECTIVE:   Patient/ reports patient with intermittent cough with oral intake as well as during nighttime. Patient with change in voice which is described as deeper and more difficult to understand. Patient's  has started chopping some foods up for patient, such as meats. Patient reports no history of GERD or PNA. She has not participated in SLP in the past.     OBJECTIVE:   Past Medical History:   Past Medical History:   Diagnosis Date    Arthritis     OSTEO, SPINE    Cancer (HCC) 2001    BASAL CELL NOSE    Chronic pain     BACK    Diabetes (HCC)     BORDERLINE, NIDDM    GERD (gastroesophageal reflux disease)     HIATAL HERNIA    Hypertension 2019    Liver disease 1960    HEP A    Thromboembolus (HCC) June 2020    Resolved.  Do not take medication (Xalelto) any longer.     Past Surgical History:   Procedure Laterality Date    BREAST SURGERY  1999 and 2019    Biopsies    CHOLECYSTECTOMY  2008    COLONOSCOPY  08/17/2021    HERNIA REPAIR  01/20/2022    robotic assisted left abdominal wall incisional hernia repair with mesh by Dr. Dane Watts at [facility]    HERNIA REPAIR  01/20/2022    robotic assisted lap bilateral inguinal hernia repairs with mesh by Dr. Dane Watts at [facility]    MOHS SURGERY  2001    BASAL CELL NOSE    ORTHOPEDIC SURGERY  2011 &2012    Lamiectomy and 2  lumber fusions    REPAIR ENTEROCELE,VAG APPRCH  2014    ENRIQUETA AND BSO (CERVIX REMOVED)  1995    TONSILLECTOMY      CHILD     Current Dietary Status: Regular texture with thin liquids, Chopped meats  Film views: Lateral and Fluoro  Patient position: Standing    Rosenbek Penetration-Aspiration Scale:  1 - Material does not enter the airway. Thin liquids via straw, Pudding, Solids    The Modified Barium Swallow Impairment Profile: [id number]  ORAL

## (undated) DEVICE — SUTURE VCRL SZ 0 L27IN ABSRB UD L36MM CT-1 1/2 CIR J260H

## (undated) DEVICE — SUTURE DEV SZ 3-0 V-LOC 90 L12IN TO L18IN CV-23 VLT VLOCM0844

## (undated) DEVICE — REDUCER CANN ENDOWRIST 12-8MM -- DA VINCI XI - SNGL USE

## (undated) DEVICE — SOLUTION IRRIG 1000ML 0.9% SOD CHL USP POUR PLAS BTL

## (undated) DEVICE — VISUALIZATION SYSTEM: Brand: CLEARIFY

## (undated) DEVICE — GARMENT,MEDLINE,DVT,INT,CALF,MED, GEN2: Brand: MEDLINE

## (undated) DEVICE — OBTRTR BLDELSS 8MM DISP -- DA VINCI - SNGL USE

## (undated) DEVICE — GENERAL LAPAROSCOPY - SMH: Brand: MEDLINE INDUSTRIES, INC.

## (undated) DEVICE — BIPOLAR FORCEPS CORD: Brand: VALLEYLAB

## (undated) DEVICE — SUTURE V-LOC SZ 0 L18IN NONABSORBABLE BLU L37MM GS-21 1/2 VLOCN0326

## (undated) DEVICE — KIT DISPOSABLE ACC 4ARM ENDOWRIST DAVINCI

## (undated) DEVICE — COVER MPLR TIP CRV SCIS ACC DA VINCI

## (undated) DEVICE — SUTURE MCRYL SZ 4-0 L27IN ABSRB UD L19MM PS-2 1/2 CIR PRIM Y426H

## (undated) DEVICE — HYPODERMIC SAFETY NEEDLE: Brand: MAGELLAN

## (undated) DEVICE — LAPAROSCOPIC SCISSORS: Brand: EPIX LAPAROSCOPIC SCISSORS

## (undated) DEVICE — GLOVE SURG SZ 8 L12IN FNGR THK79MIL GRN LTX FREE

## (undated) DEVICE — DERMABOND SKIN ADH 0.7ML -- DERMABOND ADVANCED 12/BX

## (undated) DEVICE — GLOVE ORANGE PI 7 1/2   MSG9075

## (undated) DEVICE — HANDLE LT SNAP ON ULT DURABLE LENS FOR TRUMPF ALC DISPOSABLE

## (undated) DEVICE — SUTURE V LOC 0 L24IN CLSR DEV GS-21 W/O NDL VLOCL0336

## (undated) DEVICE — PACK,BASIC,SIRUS,V: Brand: MEDLINE

## (undated) DEVICE — TROCAR SITE CLOSURE DEVICE: Brand: ENDO CLOSE